# Patient Record
Sex: MALE | Race: WHITE | NOT HISPANIC OR LATINO | URBAN - METROPOLITAN AREA
[De-identification: names, ages, dates, MRNs, and addresses within clinical notes are randomized per-mention and may not be internally consistent; named-entity substitution may affect disease eponyms.]

---

## 2021-01-01 ENCOUNTER — INPATIENT (INPATIENT)
Facility: HOSPITAL | Age: 86
LOS: 4 days | DRG: 177 | End: 2021-01-17
Attending: INTERNAL MEDICINE | Admitting: INTERNAL MEDICINE
Payer: MEDICARE

## 2021-01-01 VITALS
OXYGEN SATURATION: 74 % | SYSTOLIC BLOOD PRESSURE: 121 MMHG | TEMPERATURE: 98 F | DIASTOLIC BLOOD PRESSURE: 79 MMHG | HEART RATE: 62 BPM | RESPIRATION RATE: 26 BRPM

## 2021-01-01 VITALS
HEART RATE: 87 BPM | TEMPERATURE: 99 F | OXYGEN SATURATION: 98 % | RESPIRATION RATE: 17 BRPM | WEIGHT: 138.89 LBS | DIASTOLIC BLOOD PRESSURE: 63 MMHG | SYSTOLIC BLOOD PRESSURE: 167 MMHG

## 2021-01-01 DIAGNOSIS — I10 ESSENTIAL (PRIMARY) HYPERTENSION: ICD-10-CM

## 2021-01-01 DIAGNOSIS — U07.1 COVID-19: ICD-10-CM

## 2021-01-01 DIAGNOSIS — E78.5 HYPERLIPIDEMIA, UNSPECIFIED: ICD-10-CM

## 2021-01-01 DIAGNOSIS — R77.8 OTHER SPECIFIED ABNORMALITIES OF PLASMA PROTEINS: ICD-10-CM

## 2021-01-01 DIAGNOSIS — Z71.89 OTHER SPECIFIED COUNSELING: ICD-10-CM

## 2021-01-01 DIAGNOSIS — Z51.5 ENCOUNTER FOR PALLIATIVE CARE: ICD-10-CM

## 2021-01-01 DIAGNOSIS — N17.9 ACUTE KIDNEY FAILURE, UNSPECIFIED: ICD-10-CM

## 2021-01-01 DIAGNOSIS — J18.9 PNEUMONIA, UNSPECIFIED ORGANISM: ICD-10-CM

## 2021-01-01 DIAGNOSIS — Z29.9 ENCOUNTER FOR PROPHYLACTIC MEASURES, UNSPECIFIED: ICD-10-CM

## 2021-01-01 DIAGNOSIS — E88.09 OTHER DISORDERS OF PLASMA-PROTEIN METABOLISM, NOT ELSEWHERE CLASSIFIED: ICD-10-CM

## 2021-01-01 DIAGNOSIS — B34.2 CORONAVIRUS INFECTION, UNSPECIFIED: ICD-10-CM

## 2021-01-01 DIAGNOSIS — N18.9 CHRONIC KIDNEY DISEASE, UNSPECIFIED: ICD-10-CM

## 2021-01-01 DIAGNOSIS — R53.81 OTHER MALAISE: ICD-10-CM

## 2021-01-01 DIAGNOSIS — M10.9 GOUT, UNSPECIFIED: ICD-10-CM

## 2021-01-01 LAB
-  AMIKACIN: SIGNIFICANT CHANGE UP
-  AZTREONAM: SIGNIFICANT CHANGE UP
-  CEFEPIME: SIGNIFICANT CHANGE UP
-  CEFTAZIDIME: SIGNIFICANT CHANGE UP
-  CIPROFLOXACIN: SIGNIFICANT CHANGE UP
-  GENTAMICIN: SIGNIFICANT CHANGE UP
-  IMIPENEM: SIGNIFICANT CHANGE UP
-  LEVOFLOXACIN: SIGNIFICANT CHANGE UP
-  MEROPENEM: SIGNIFICANT CHANGE UP
-  PIPERACILLIN/TAZOBACTAM: SIGNIFICANT CHANGE UP
-  TOBRAMYCIN: SIGNIFICANT CHANGE UP
A1C WITH ESTIMATED AVERAGE GLUCOSE RESULT: 4.6 % — SIGNIFICANT CHANGE UP (ref 4–5.6)
ALBUMIN SERPL ELPH-MCNC: 2.1 G/DL — LOW (ref 3.5–5)
ALBUMIN SERPL ELPH-MCNC: 2.2 G/DL — LOW (ref 3.5–5)
ALBUMIN SERPL ELPH-MCNC: 2.3 G/DL — LOW (ref 3.5–5)
ALBUMIN SERPL ELPH-MCNC: 2.4 G/DL — LOW (ref 3.5–5)
ALBUMIN SERPL ELPH-MCNC: 2.7 G/DL — LOW (ref 3.5–5)
ALP SERPL-CCNC: 69 U/L — SIGNIFICANT CHANGE UP (ref 40–120)
ALP SERPL-CCNC: 75 U/L — SIGNIFICANT CHANGE UP (ref 40–120)
ALP SERPL-CCNC: 77 U/L — SIGNIFICANT CHANGE UP (ref 40–120)
ALP SERPL-CCNC: 82 U/L — SIGNIFICANT CHANGE UP (ref 40–120)
ALP SERPL-CCNC: 94 U/L — SIGNIFICANT CHANGE UP (ref 40–120)
ALT FLD-CCNC: 22 U/L DA — SIGNIFICANT CHANGE UP (ref 10–60)
ALT FLD-CCNC: 22 U/L DA — SIGNIFICANT CHANGE UP (ref 10–60)
ALT FLD-CCNC: 24 U/L DA — SIGNIFICANT CHANGE UP (ref 10–60)
ALT FLD-CCNC: 32 U/L DA — SIGNIFICANT CHANGE UP (ref 10–60)
ALT FLD-CCNC: 39 U/L DA — SIGNIFICANT CHANGE UP (ref 10–60)
ANION GAP SERPL CALC-SCNC: 15 MMOL/L — SIGNIFICANT CHANGE UP (ref 5–17)
ANION GAP SERPL CALC-SCNC: 16 MMOL/L — SIGNIFICANT CHANGE UP (ref 5–17)
ANION GAP SERPL CALC-SCNC: 16 MMOL/L — SIGNIFICANT CHANGE UP (ref 5–17)
ANION GAP SERPL CALC-SCNC: 17 MMOL/L — SIGNIFICANT CHANGE UP (ref 5–17)
ANION GAP SERPL CALC-SCNC: 18 MMOL/L — HIGH (ref 5–17)
ANION GAP SERPL CALC-SCNC: 19 MMOL/L — HIGH (ref 5–17)
ANISOCYTOSIS BLD QL: SIGNIFICANT CHANGE UP
APPEARANCE UR: CLEAR — SIGNIFICANT CHANGE UP
APTT BLD: 29.2 SEC — SIGNIFICANT CHANGE UP (ref 27.5–35.5)
AST SERPL-CCNC: 17 U/L — SIGNIFICANT CHANGE UP (ref 10–40)
AST SERPL-CCNC: 17 U/L — SIGNIFICANT CHANGE UP (ref 10–40)
AST SERPL-CCNC: 18 U/L — SIGNIFICANT CHANGE UP (ref 10–40)
AST SERPL-CCNC: 21 U/L — SIGNIFICANT CHANGE UP (ref 10–40)
AST SERPL-CCNC: 23 U/L — SIGNIFICANT CHANGE UP (ref 10–40)
BACTERIA # UR AUTO: ABNORMAL /HPF
BASE EXCESS BLDA CALC-SCNC: -4.8 MMOL/L — LOW (ref -2–2)
BASOPHILS # BLD AUTO: 0.01 K/UL — SIGNIFICANT CHANGE UP (ref 0–0.2)
BASOPHILS # BLD AUTO: 0.01 K/UL — SIGNIFICANT CHANGE UP (ref 0–0.2)
BASOPHILS NFR BLD AUTO: 0.1 % — SIGNIFICANT CHANGE UP (ref 0–2)
BILIRUB SERPL-MCNC: 0.7 MG/DL — SIGNIFICANT CHANGE UP (ref 0.2–1.2)
BILIRUB SERPL-MCNC: 0.7 MG/DL — SIGNIFICANT CHANGE UP (ref 0.2–1.2)
BILIRUB SERPL-MCNC: 0.8 MG/DL — SIGNIFICANT CHANGE UP (ref 0.2–1.2)
BILIRUB SERPL-MCNC: 0.9 MG/DL — SIGNIFICANT CHANGE UP (ref 0.2–1.2)
BILIRUB SERPL-MCNC: 1.5 MG/DL — HIGH (ref 0.2–1.2)
BILIRUB UR-MCNC: NEGATIVE — SIGNIFICANT CHANGE UP
BLOOD GAS COMMENTS ARTERIAL: SIGNIFICANT CHANGE UP
BUN SERPL-MCNC: 167 MG/DL — HIGH (ref 7–18)
BUN SERPL-MCNC: 174 MG/DL — HIGH (ref 7–18)
BUN SERPL-MCNC: 181 MG/DL — HIGH (ref 7–18)
BUN SERPL-MCNC: 186 MG/DL — SIGNIFICANT CHANGE UP (ref 7–18)
BUN SERPL-MCNC: 188 MG/DL — HIGH (ref 7–18)
BUN SERPL-MCNC: 199 MG/DL — HIGH (ref 7–18)
CALCIUM SERPL-MCNC: 8.1 MG/DL — LOW (ref 8.4–10.5)
CALCIUM SERPL-MCNC: 8.2 MG/DL — LOW (ref 8.4–10.5)
CALCIUM SERPL-MCNC: 8.2 MG/DL — LOW (ref 8.4–10.5)
CALCIUM SERPL-MCNC: 8.3 MG/DL — LOW (ref 8.4–10.5)
CALCIUM SERPL-MCNC: 8.4 MG/DL — SIGNIFICANT CHANGE UP (ref 8.4–10.5)
CALCIUM SERPL-MCNC: 8.6 MG/DL — SIGNIFICANT CHANGE UP (ref 8.4–10.5)
CALCIUM UR-MCNC: 3.8 MG/DL — SIGNIFICANT CHANGE UP
CHLORIDE SERPL-SCNC: 104 MMOL/L — SIGNIFICANT CHANGE UP (ref 96–108)
CHLORIDE SERPL-SCNC: 107 MMOL/L — SIGNIFICANT CHANGE UP (ref 96–108)
CHLORIDE SERPL-SCNC: 109 MMOL/L — HIGH (ref 96–108)
CHLORIDE SERPL-SCNC: 109 MMOL/L — HIGH (ref 96–108)
CHLORIDE UR-SCNC: 41 MMOL/L — SIGNIFICANT CHANGE UP
CHOLEST SERPL-MCNC: 104 MG/DL — SIGNIFICANT CHANGE UP
CK MB BLD-MCNC: 7.2 % — HIGH (ref 0–3.5)
CK MB CFR SERPL CALC: 1.8 NG/ML — SIGNIFICANT CHANGE UP (ref 0–3.6)
CK SERPL-CCNC: 25 U/L — LOW (ref 35–232)
CO2 SERPL-SCNC: 16 MMOL/L — LOW (ref 22–31)
CO2 SERPL-SCNC: 17 MMOL/L — LOW (ref 22–31)
CO2 SERPL-SCNC: 17 MMOL/L — LOW (ref 22–31)
CO2 SERPL-SCNC: 19 MMOL/L — LOW (ref 22–31)
CO2 SERPL-SCNC: 19 MMOL/L — LOW (ref 22–31)
CO2 SERPL-SCNC: 21 MMOL/L — LOW (ref 22–31)
COLOR SPEC: YELLOW — SIGNIFICANT CHANGE UP
CREAT ?TM UR-MCNC: 49 MG/DL — SIGNIFICANT CHANGE UP
CREAT ?TM UR-MCNC: 51 MG/DL — SIGNIFICANT CHANGE UP
CREAT SERPL-MCNC: 4.99 MG/DL — HIGH (ref 0.5–1.3)
CREAT SERPL-MCNC: 5.06 MG/DL — HIGH (ref 0.5–1.3)
CREAT SERPL-MCNC: 5.16 MG/DL — HIGH (ref 0.5–1.3)
CREAT SERPL-MCNC: 5.26 MG/DL — HIGH (ref 0.5–1.3)
CREAT SERPL-MCNC: 5.27 MG/DL — HIGH (ref 0.5–1.3)
CREAT SERPL-MCNC: 5.84 MG/DL — HIGH (ref 0.5–1.3)
CRP SERPL-MCNC: 11.66 MG/DL — HIGH (ref 0–0.4)
CRP SERPL-MCNC: 15.88 MG/DL — HIGH (ref 0–0.4)
CULTURE RESULTS: SIGNIFICANT CHANGE UP
D DIMER BLD IA.RAPID-MCNC: 4239 NG/ML DDU — HIGH
DIFF PNL FLD: ABNORMAL
EOSINOPHIL # BLD AUTO: 0 K/UL — SIGNIFICANT CHANGE UP (ref 0–0.5)
EOSINOPHIL # BLD AUTO: 0 K/UL — SIGNIFICANT CHANGE UP (ref 0–0.5)
EOSINOPHIL NFR BLD AUTO: 0 % — SIGNIFICANT CHANGE UP (ref 0–6)
EPI CELLS # UR: SIGNIFICANT CHANGE UP /HPF
ERYTHROCYTE [SEDIMENTATION RATE] IN BLOOD: 75 MM/HR — HIGH (ref 0–20)
ESTIMATED AVERAGE GLUCOSE: 85 MG/DL — SIGNIFICANT CHANGE UP (ref 68–114)
FERRITIN SERPL-MCNC: 1527 NG/ML — HIGH (ref 30–400)
FERRITIN SERPL-MCNC: 4201 NG/ML — HIGH (ref 30–400)
FOLATE SERPL-MCNC: 12.8 NG/ML — SIGNIFICANT CHANGE UP
GLUCOSE BLDC GLUCOMTR-MCNC: 106 MG/DL — HIGH (ref 70–99)
GLUCOSE BLDC GLUCOMTR-MCNC: 118 MG/DL — HIGH (ref 70–99)
GLUCOSE BLDC GLUCOMTR-MCNC: 144 MG/DL — HIGH (ref 70–99)
GLUCOSE SERPL-MCNC: 110 MG/DL — HIGH (ref 70–99)
GLUCOSE SERPL-MCNC: 132 MG/DL — HIGH (ref 70–99)
GLUCOSE SERPL-MCNC: 146 MG/DL — HIGH (ref 70–99)
GLUCOSE SERPL-MCNC: 154 MG/DL — HIGH (ref 70–99)
GLUCOSE SERPL-MCNC: 157 MG/DL — HIGH (ref 70–99)
GLUCOSE SERPL-MCNC: 234 MG/DL — HIGH (ref 70–99)
GLUCOSE UR QL: NEGATIVE — SIGNIFICANT CHANGE UP
HCO3 BLDA-SCNC: 19 MMOL/L — LOW (ref 23–27)
HCT VFR BLD CALC: 28.7 % — LOW (ref 39–50)
HCT VFR BLD CALC: 28.8 % — LOW (ref 39–50)
HCT VFR BLD CALC: 29.9 % — LOW (ref 39–50)
HCT VFR BLD CALC: 30.5 % — LOW (ref 39–50)
HCT VFR BLD CALC: 32 % — LOW (ref 39–50)
HCT VFR BLD CALC: 33.7 % — LOW (ref 39–50)
HDLC SERPL-MCNC: 41 MG/DL — SIGNIFICANT CHANGE UP
HGB BLD-MCNC: 10.2 G/DL — LOW (ref 13–17)
HGB BLD-MCNC: 8.8 G/DL — LOW (ref 13–17)
HGB BLD-MCNC: 9 G/DL — LOW (ref 13–17)
HGB BLD-MCNC: 9.2 G/DL — LOW (ref 13–17)
HGB BLD-MCNC: 9.6 G/DL — LOW (ref 13–17)
HGB BLD-MCNC: 9.8 G/DL — LOW (ref 13–17)
HOROWITZ INDEX BLDA+IHG-RTO: 100 — SIGNIFICANT CHANGE UP
HYALINE CASTS # UR AUTO: ABNORMAL /LPF
HYPERCHROMIA BLD QL AUTO: SLIGHT — SIGNIFICANT CHANGE UP
IMM GRANULOCYTES NFR BLD AUTO: 0.7 % — SIGNIFICANT CHANGE UP (ref 0–1.5)
IMM GRANULOCYTES NFR BLD AUTO: 1 % — SIGNIFICANT CHANGE UP (ref 0–1.5)
INR BLD: 1.67 RATIO — HIGH (ref 0.88–1.16)
INR BLD: 1.72 RATIO — HIGH (ref 0.88–1.16)
KETONES UR-MCNC: NEGATIVE — SIGNIFICANT CHANGE UP
LACTATE SERPL-SCNC: 1.2 MMOL/L — SIGNIFICANT CHANGE UP (ref 0.7–2)
LDH SERPL L TO P-CCNC: 302 U/L — HIGH (ref 120–225)
LEUKOCYTE ESTERASE UR-ACNC: ABNORMAL
LIPID PNL WITH DIRECT LDL SERPL: 41 MG/DL — SIGNIFICANT CHANGE UP
LYMPHOCYTES # BLD AUTO: 0.21 K/UL — LOW (ref 1–3.3)
LYMPHOCYTES # BLD AUTO: 0.53 K/UL — LOW (ref 1–3.3)
LYMPHOCYTES # BLD AUTO: 5.9 % — LOW (ref 13–44)
MAGNESIUM SERPL-MCNC: 1.9 MG/DL — SIGNIFICANT CHANGE UP (ref 1.6–2.6)
MAGNESIUM SERPL-MCNC: 2 MG/DL — SIGNIFICANT CHANGE UP (ref 1.6–2.6)
MAGNESIUM SERPL-MCNC: 2 MG/DL — SIGNIFICANT CHANGE UP (ref 1.6–2.6)
MCHC RBC-ENTMCNC: 28.8 PG — SIGNIFICANT CHANGE UP (ref 27–34)
MCHC RBC-ENTMCNC: 28.8 PG — SIGNIFICANT CHANGE UP (ref 27–34)
MCHC RBC-ENTMCNC: 28.9 PG — SIGNIFICANT CHANGE UP (ref 27–34)
MCHC RBC-ENTMCNC: 29 PG — SIGNIFICANT CHANGE UP (ref 27–34)
MCHC RBC-ENTMCNC: 29.4 PG — SIGNIFICANT CHANGE UP (ref 27–34)
MCHC RBC-ENTMCNC: 29.8 PG — SIGNIFICANT CHANGE UP (ref 27–34)
MCHC RBC-ENTMCNC: 30.3 GM/DL — LOW (ref 32–36)
MCHC RBC-ENTMCNC: 30.6 GM/DL — LOW (ref 32–36)
MCHC RBC-ENTMCNC: 30.7 GM/DL — LOW (ref 32–36)
MCHC RBC-ENTMCNC: 30.8 GM/DL — LOW (ref 32–36)
MCHC RBC-ENTMCNC: 31.3 GM/DL — LOW (ref 32–36)
MCHC RBC-ENTMCNC: 31.5 GM/DL — LOW (ref 32–36)
MCV RBC AUTO: 93.8 FL — SIGNIFICANT CHANGE UP (ref 80–100)
MCV RBC AUTO: 94.1 FL — SIGNIFICANT CHANGE UP (ref 80–100)
MCV RBC AUTO: 94.1 FL — SIGNIFICANT CHANGE UP (ref 80–100)
MCV RBC AUTO: 94.3 FL — SIGNIFICANT CHANGE UP (ref 80–100)
MCV RBC AUTO: 94.7 FL — SIGNIFICANT CHANGE UP (ref 80–100)
MCV RBC AUTO: 95.5 FL — SIGNIFICANT CHANGE UP (ref 80–100)
METHOD TYPE: SIGNIFICANT CHANGE UP
MICROCYTES BLD QL: SIGNIFICANT CHANGE UP
MONOCYTES # BLD AUTO: 0.12 K/UL — SIGNIFICANT CHANGE UP (ref 0–0.9)
MONOCYTES # BLD AUTO: 0.17 K/UL — SIGNIFICANT CHANGE UP (ref 0–0.9)
MONOCYTES NFR BLD AUTO: 1.9 % — LOW (ref 2–14)
NEUTROPHILS # BLD AUTO: 7.61 K/UL — HIGH (ref 1.8–7.4)
NEUTROPHILS # BLD AUTO: 8.28 K/UL — HIGH (ref 1.8–7.4)
NEUTROPHILS NFR BLD AUTO: 91.4 % — HIGH (ref 43–77)
NEUTROPHILS NFR BLD AUTO: 94.8 % — HIGH (ref 43–77)
NITRITE UR-MCNC: NEGATIVE — SIGNIFICANT CHANGE UP
NON HDL CHOLESTEROL: 63 MG/DL — SIGNIFICANT CHANGE UP
NRBC # BLD: 0 /100 WBCS — SIGNIFICANT CHANGE UP (ref 0–0)
NT-PROBNP SERPL-SCNC: 9057 PG/ML — HIGH (ref 0–450)
ORGANISM # SPEC MICROSCOPIC CNT: SIGNIFICANT CHANGE UP
ORGANISM # SPEC MICROSCOPIC CNT: SIGNIFICANT CHANGE UP
OSMOLALITY UR: 392 MOS/KG — SIGNIFICANT CHANGE UP (ref 50–1200)
OVALOCYTES BLD QL SMEAR: SLIGHT — SIGNIFICANT CHANGE UP
PCO2 BLDA: 31 MMHG — LOW (ref 32–46)
PH BLDA: 7.4 — SIGNIFICANT CHANGE UP (ref 7.35–7.45)
PH UR: 5 — SIGNIFICANT CHANGE UP (ref 5–8)
PHOSPHATE SERPL-MCNC: 5.8 MG/DL — HIGH (ref 2.5–4.5)
PHOSPHATE SERPL-MCNC: 6.3 MG/DL — HIGH (ref 2.5–4.5)
PHOSPHATE SERPL-MCNC: 6.3 MG/DL — HIGH (ref 2.5–4.5)
PHOSPHATE SERPL-MCNC: 7 MG/DL — HIGH (ref 2.5–4.5)
PHOSPHATE SERPL-MCNC: 7.2 MG/DL — HIGH (ref 2.5–4.5)
PLAT MORPH BLD: NORMAL — SIGNIFICANT CHANGE UP
PLATELET # BLD AUTO: 101 K/UL — LOW (ref 150–400)
PLATELET # BLD AUTO: 106 K/UL — LOW (ref 150–400)
PLATELET # BLD AUTO: 109 K/UL — LOW (ref 150–400)
PLATELET # BLD AUTO: 118 K/UL — LOW (ref 150–400)
PLATELET # BLD AUTO: 118 K/UL — LOW (ref 150–400)
PLATELET # BLD AUTO: 93 K/UL — LOW (ref 150–400)
PO2 BLDA: 128 MMHG — HIGH (ref 74–108)
POIKILOCYTOSIS BLD QL AUTO: SLIGHT — SIGNIFICANT CHANGE UP
POTASSIUM SERPL-MCNC: 3.6 MMOL/L — SIGNIFICANT CHANGE UP (ref 3.5–5.3)
POTASSIUM SERPL-MCNC: 3.9 MMOL/L — SIGNIFICANT CHANGE UP (ref 3.5–5.3)
POTASSIUM SERPL-MCNC: 4 MMOL/L — SIGNIFICANT CHANGE UP (ref 3.5–5.3)
POTASSIUM SERPL-MCNC: 4.1 MMOL/L — SIGNIFICANT CHANGE UP (ref 3.5–5.3)
POTASSIUM SERPL-MCNC: 4.1 MMOL/L — SIGNIFICANT CHANGE UP (ref 3.5–5.3)
POTASSIUM SERPL-MCNC: 4.3 MMOL/L — SIGNIFICANT CHANGE UP (ref 3.5–5.3)
POTASSIUM SERPL-SCNC: 3.6 MMOL/L — SIGNIFICANT CHANGE UP (ref 3.5–5.3)
POTASSIUM SERPL-SCNC: 3.9 MMOL/L — SIGNIFICANT CHANGE UP (ref 3.5–5.3)
POTASSIUM SERPL-SCNC: 4 MMOL/L — SIGNIFICANT CHANGE UP (ref 3.5–5.3)
POTASSIUM SERPL-SCNC: 4.1 MMOL/L — SIGNIFICANT CHANGE UP (ref 3.5–5.3)
POTASSIUM SERPL-SCNC: 4.1 MMOL/L — SIGNIFICANT CHANGE UP (ref 3.5–5.3)
POTASSIUM SERPL-SCNC: 4.3 MMOL/L — SIGNIFICANT CHANGE UP (ref 3.5–5.3)
PROT SERPL-MCNC: 5.9 G/DL — LOW (ref 6–8.3)
PROT SERPL-MCNC: 6 G/DL — SIGNIFICANT CHANGE UP (ref 6–8.3)
PROT SERPL-MCNC: 6.3 G/DL — SIGNIFICANT CHANGE UP (ref 6–8.3)
PROT SERPL-MCNC: 6.3 G/DL — SIGNIFICANT CHANGE UP (ref 6–8.3)
PROT SERPL-MCNC: 6.5 G/DL — SIGNIFICANT CHANGE UP (ref 6–8.3)
PROT UR-MCNC: 100
PROTHROM AB SERPL-ACNC: 19.4 SEC — HIGH (ref 10.6–13.6)
PROTHROM AB SERPL-ACNC: 19.9 SEC — HIGH (ref 10.6–13.6)
RAPID RVP RESULT: DETECTED
RBC # BLD: 3.06 M/UL — LOW (ref 4.2–5.8)
RBC # BLD: 3.06 M/UL — LOW (ref 4.2–5.8)
RBC # BLD: 3.17 M/UL — LOW (ref 4.2–5.8)
RBC # BLD: 3.22 M/UL — LOW (ref 4.2–5.8)
RBC # BLD: 3.4 M/UL — LOW (ref 4.2–5.8)
RBC # BLD: 3.53 M/UL — LOW (ref 4.2–5.8)
RBC # FLD: 17.6 % — HIGH (ref 10.3–14.5)
RBC # FLD: 17.7 % — HIGH (ref 10.3–14.5)
RBC # FLD: 17.7 % — HIGH (ref 10.3–14.5)
RBC # FLD: 17.9 % — HIGH (ref 10.3–14.5)
RBC # FLD: 18 % — HIGH (ref 10.3–14.5)
RBC # FLD: 18.2 % — HIGH (ref 10.3–14.5)
RBC BLD AUTO: ABNORMAL
RBC CASTS # UR COMP ASSIST: ABNORMAL /HPF (ref 0–2)
SAO2 % BLDA: 99 % — HIGH (ref 92–96)
SARS-COV-2 IGG SERPL QL IA: POSITIVE
SARS-COV-2 IGM SERPL IA-ACNC: 6.82 INDEX — HIGH
SARS-COV-2 RNA SPEC QL NAA+PROBE: DETECTED
SCHISTOCYTES BLD QL AUTO: SLIGHT — SIGNIFICANT CHANGE UP
SODIUM SERPL-SCNC: 140 MMOL/L — SIGNIFICANT CHANGE UP (ref 135–145)
SODIUM SERPL-SCNC: 142 MMOL/L — SIGNIFICANT CHANGE UP (ref 135–145)
SODIUM SERPL-SCNC: 143 MMOL/L — SIGNIFICANT CHANGE UP (ref 135–145)
SODIUM SERPL-SCNC: 144 MMOL/L — SIGNIFICANT CHANGE UP (ref 135–145)
SODIUM UR-SCNC: 47 MMOL/L — SIGNIFICANT CHANGE UP
SODIUM UR-SCNC: 54 MMOL/L — SIGNIFICANT CHANGE UP
SP GR SPEC: 1.01 — SIGNIFICANT CHANGE UP (ref 1.01–1.02)
SPECIMEN SOURCE: SIGNIFICANT CHANGE UP
SPHEROCYTES BLD QL SMEAR: SLIGHT — SIGNIFICANT CHANGE UP
TRIGL SERPL-MCNC: 112 MG/DL — SIGNIFICANT CHANGE UP
TROPONIN I SERPL-MCNC: 0.06 NG/ML — HIGH (ref 0–0.04)
TROPONIN I SERPL-MCNC: 0.07 NG/ML — HIGH (ref 0–0.04)
TSH SERPL-MCNC: 0.57 UU/ML — SIGNIFICANT CHANGE UP (ref 0.34–4.82)
UROBILINOGEN FLD QL: NEGATIVE — SIGNIFICANT CHANGE UP
VIT B12 SERPL-MCNC: 810 PG/ML — SIGNIFICANT CHANGE UP (ref 232–1245)
WBC # BLD: 3.31 K/UL — LOW (ref 3.8–10.5)
WBC # BLD: 5.88 K/UL — SIGNIFICANT CHANGE UP (ref 3.8–10.5)
WBC # BLD: 6.84 K/UL — SIGNIFICANT CHANGE UP (ref 3.8–10.5)
WBC # BLD: 8.03 K/UL — SIGNIFICANT CHANGE UP (ref 3.8–10.5)
WBC # BLD: 8.89 K/UL — SIGNIFICANT CHANGE UP (ref 3.8–10.5)
WBC # BLD: 9.05 K/UL — SIGNIFICANT CHANGE UP (ref 3.8–10.5)
WBC # FLD AUTO: 3.31 K/UL — LOW (ref 3.8–10.5)
WBC # FLD AUTO: 5.88 K/UL — SIGNIFICANT CHANGE UP (ref 3.8–10.5)
WBC # FLD AUTO: 6.84 K/UL — SIGNIFICANT CHANGE UP (ref 3.8–10.5)
WBC # FLD AUTO: 8.03 K/UL — SIGNIFICANT CHANGE UP (ref 3.8–10.5)
WBC # FLD AUTO: 8.89 K/UL — SIGNIFICANT CHANGE UP (ref 3.8–10.5)
WBC # FLD AUTO: 9.05 K/UL — SIGNIFICANT CHANGE UP (ref 3.8–10.5)
WBC UR QL: SIGNIFICANT CHANGE UP /HPF (ref 0–5)

## 2021-01-01 PROCEDURE — 99222 1ST HOSP IP/OBS MODERATE 55: CPT

## 2021-01-01 PROCEDURE — 85652 RBC SED RATE AUTOMATED: CPT

## 2021-01-01 PROCEDURE — 82746 ASSAY OF FOLIC ACID SERUM: CPT

## 2021-01-01 PROCEDURE — 85379 FIBRIN DEGRADATION QUANT: CPT

## 2021-01-01 PROCEDURE — 87186 SC STD MICRODIL/AGAR DIL: CPT

## 2021-01-01 PROCEDURE — 80053 COMPREHEN METABOLIC PANEL: CPT

## 2021-01-01 PROCEDURE — 84100 ASSAY OF PHOSPHORUS: CPT

## 2021-01-01 PROCEDURE — 87086 URINE CULTURE/COLONY COUNT: CPT

## 2021-01-01 PROCEDURE — 85730 THROMBOPLASTIN TIME PARTIAL: CPT

## 2021-01-01 PROCEDURE — 82550 ASSAY OF CK (CPK): CPT

## 2021-01-01 PROCEDURE — 84484 ASSAY OF TROPONIN QUANT: CPT

## 2021-01-01 PROCEDURE — 83935 ASSAY OF URINE OSMOLALITY: CPT

## 2021-01-01 PROCEDURE — 36415 COLL VENOUS BLD VENIPUNCTURE: CPT

## 2021-01-01 PROCEDURE — 84443 ASSAY THYROID STIM HORMONE: CPT

## 2021-01-01 PROCEDURE — 82803 BLOOD GASES ANY COMBINATION: CPT

## 2021-01-01 PROCEDURE — 71045 X-RAY EXAM CHEST 1 VIEW: CPT

## 2021-01-01 PROCEDURE — 82607 VITAMIN B-12: CPT

## 2021-01-01 PROCEDURE — 82570 ASSAY OF URINE CREATININE: CPT

## 2021-01-01 PROCEDURE — 85025 COMPLETE CBC W/AUTO DIFF WBC: CPT

## 2021-01-01 PROCEDURE — 85610 PROTHROMBIN TIME: CPT

## 2021-01-01 PROCEDURE — 82436 ASSAY OF URINE CHLORIDE: CPT

## 2021-01-01 PROCEDURE — 87040 BLOOD CULTURE FOR BACTERIA: CPT

## 2021-01-01 PROCEDURE — 83605 ASSAY OF LACTIC ACID: CPT

## 2021-01-01 PROCEDURE — 80061 LIPID PANEL: CPT

## 2021-01-01 PROCEDURE — 94640 AIRWAY INHALATION TREATMENT: CPT

## 2021-01-01 PROCEDURE — 93005 ELECTROCARDIOGRAM TRACING: CPT

## 2021-01-01 PROCEDURE — 83880 ASSAY OF NATRIURETIC PEPTIDE: CPT

## 2021-01-01 PROCEDURE — 82340 ASSAY OF CALCIUM IN URINE: CPT

## 2021-01-01 PROCEDURE — 83735 ASSAY OF MAGNESIUM: CPT

## 2021-01-01 PROCEDURE — 82728 ASSAY OF FERRITIN: CPT

## 2021-01-01 PROCEDURE — 86769 SARS-COV-2 COVID-19 ANTIBODY: CPT

## 2021-01-01 PROCEDURE — 85027 COMPLETE CBC AUTOMATED: CPT

## 2021-01-01 PROCEDURE — 80048 BASIC METABOLIC PNL TOTAL CA: CPT

## 2021-01-01 PROCEDURE — 99285 EMERGENCY DEPT VISIT HI MDM: CPT

## 2021-01-01 PROCEDURE — 83036 HEMOGLOBIN GLYCOSYLATED A1C: CPT

## 2021-01-01 PROCEDURE — 71045 X-RAY EXAM CHEST 1 VIEW: CPT | Mod: 26

## 2021-01-01 PROCEDURE — 0225U NFCT DS DNA&RNA 21 SARSCOV2: CPT

## 2021-01-01 PROCEDURE — 82553 CREATINE MB FRACTION: CPT

## 2021-01-01 PROCEDURE — 83615 LACTATE (LD) (LDH) ENZYME: CPT

## 2021-01-01 PROCEDURE — 99285 EMERGENCY DEPT VISIT HI MDM: CPT | Mod: 25

## 2021-01-01 PROCEDURE — 84300 ASSAY OF URINE SODIUM: CPT

## 2021-01-01 PROCEDURE — 99497 ADVNCD CARE PLAN 30 MIN: CPT | Mod: 25

## 2021-01-01 PROCEDURE — 96374 THER/PROPH/DIAG INJ IV PUSH: CPT

## 2021-01-01 PROCEDURE — 86140 C-REACTIVE PROTEIN: CPT

## 2021-01-01 PROCEDURE — 81001 URINALYSIS AUTO W/SCOPE: CPT

## 2021-01-01 PROCEDURE — 82962 GLUCOSE BLOOD TEST: CPT

## 2021-01-01 RX ORDER — HYDROMORPHONE HYDROCHLORIDE 2 MG/ML
0.5 INJECTION INTRAMUSCULAR; INTRAVENOUS; SUBCUTANEOUS EVERY 4 HOURS
Refills: 0 | Status: DISCONTINUED | OUTPATIENT
Start: 2021-01-01 | End: 2021-01-01

## 2021-01-01 RX ORDER — ALLOPURINOL 300 MG
100 TABLET ORAL AT BEDTIME
Refills: 0 | Status: DISCONTINUED | OUTPATIENT
Start: 2021-01-01 | End: 2021-01-01

## 2021-01-01 RX ORDER — HYDRALAZINE HCL 50 MG
1 TABLET ORAL
Qty: 0 | Refills: 0 | DISCHARGE

## 2021-01-01 RX ORDER — ERYTHROPOIETIN 10000 [IU]/ML
10000 INJECTION, SOLUTION INTRAVENOUS; SUBCUTANEOUS
Refills: 0 | Status: DISCONTINUED | OUTPATIENT
Start: 2021-01-01 | End: 2021-01-01

## 2021-01-01 RX ORDER — POTASSIUM CHLORIDE 20 MEQ
20 PACKET (EA) ORAL
Refills: 0 | Status: COMPLETED | OUTPATIENT
Start: 2021-01-01 | End: 2021-01-01

## 2021-01-01 RX ORDER — ALLOPURINOL 300 MG
1 TABLET ORAL
Qty: 0 | Refills: 0 | DISCHARGE

## 2021-01-01 RX ORDER — ACETAMINOPHEN 500 MG
1000 TABLET ORAL ONCE
Refills: 0 | Status: COMPLETED | OUTPATIENT
Start: 2021-01-01 | End: 2021-01-01

## 2021-01-01 RX ORDER — ZINC SULFATE TAB 220 MG (50 MG ZINC EQUIVALENT) 220 (50 ZN) MG
1 TAB ORAL
Qty: 0 | Refills: 0 | DISCHARGE

## 2021-01-01 RX ORDER — LATANOPROST 0.05 MG/ML
1 SOLUTION/ DROPS OPHTHALMIC; TOPICAL AT BEDTIME
Refills: 0 | Status: DISCONTINUED | OUTPATIENT
Start: 2021-01-01 | End: 2021-01-01

## 2021-01-01 RX ORDER — APIXABAN 2.5 MG/1
1 TABLET, FILM COATED ORAL
Qty: 0 | Refills: 0 | DISCHARGE

## 2021-01-01 RX ORDER — SEVELAMER CARBONATE 2400 MG/1
800 POWDER, FOR SUSPENSION ORAL
Refills: 0 | Status: DISCONTINUED | OUTPATIENT
Start: 2021-01-01 | End: 2021-01-01

## 2021-01-01 RX ORDER — DEXAMETHASONE 0.5 MG/5ML
6 ELIXIR ORAL ONCE
Refills: 0 | Status: COMPLETED | OUTPATIENT
Start: 2021-01-01 | End: 2021-01-01

## 2021-01-01 RX ORDER — HEPARIN SODIUM 5000 [USP'U]/ML
5000 INJECTION INTRAVENOUS; SUBCUTANEOUS EVERY 8 HOURS
Refills: 0 | Status: DISCONTINUED | OUTPATIENT
Start: 2021-01-01 | End: 2021-01-01

## 2021-01-01 RX ORDER — ASCORBIC ACID 60 MG
1 TABLET,CHEWABLE ORAL
Qty: 0 | Refills: 0 | DISCHARGE

## 2021-01-01 RX ORDER — AMLODIPINE BESYLATE 2.5 MG/1
10 TABLET ORAL DAILY
Refills: 0 | Status: DISCONTINUED | OUTPATIENT
Start: 2021-01-01 | End: 2021-01-01

## 2021-01-01 RX ORDER — SODIUM CHLORIDE 9 MG/ML
1000 INJECTION, SOLUTION INTRAVENOUS
Refills: 0 | Status: DISCONTINUED | OUTPATIENT
Start: 2021-01-01 | End: 2021-01-01

## 2021-01-01 RX ORDER — DEXAMETHASONE 0.5 MG/5ML
6 ELIXIR ORAL DAILY
Refills: 0 | Status: DISCONTINUED | OUTPATIENT
Start: 2021-01-01 | End: 2021-01-01

## 2021-01-01 RX ORDER — SODIUM BICARBONATE 1 MEQ/ML
1 SYRINGE (ML) INTRAVENOUS
Qty: 0 | Refills: 0 | DISCHARGE

## 2021-01-01 RX ORDER — SODIUM BICARBONATE 1 MEQ/ML
650 SYRINGE (ML) INTRAVENOUS DAILY
Refills: 0 | Status: DISCONTINUED | OUTPATIENT
Start: 2021-01-01 | End: 2021-01-01

## 2021-01-01 RX ORDER — PANTOPRAZOLE SODIUM 20 MG/1
40 TABLET, DELAYED RELEASE ORAL
Refills: 0 | Status: DISCONTINUED | OUTPATIENT
Start: 2021-01-01 | End: 2021-01-01

## 2021-01-01 RX ORDER — SODIUM BICARBONATE 1 MEQ/ML
650 SYRINGE (ML) INTRAVENOUS THREE TIMES A DAY
Refills: 0 | Status: DISCONTINUED | OUTPATIENT
Start: 2021-01-01 | End: 2021-01-01

## 2021-01-01 RX ORDER — SEVELAMER CARBONATE 2400 MG/1
1 POWDER, FOR SUSPENSION ORAL
Qty: 0 | Refills: 0 | DISCHARGE

## 2021-01-01 RX ORDER — CHOLECALCIFEROL (VITAMIN D3) 125 MCG
2000 CAPSULE ORAL DAILY
Refills: 0 | Status: DISCONTINUED | OUTPATIENT
Start: 2021-01-01 | End: 2021-01-01

## 2021-01-01 RX ORDER — FAMOTIDINE 10 MG/ML
1 INJECTION INTRAVENOUS
Qty: 0 | Refills: 0 | DISCHARGE

## 2021-01-01 RX ORDER — ASPIRIN/CALCIUM CARB/MAGNESIUM 324 MG
1 TABLET ORAL
Qty: 0 | Refills: 0 | DISCHARGE

## 2021-01-01 RX ORDER — HYDROMORPHONE HYDROCHLORIDE 2 MG/ML
0.25 INJECTION INTRAMUSCULAR; INTRAVENOUS; SUBCUTANEOUS ONCE
Refills: 0 | Status: DISCONTINUED | OUTPATIENT
Start: 2021-01-01 | End: 2021-01-01

## 2021-01-01 RX ORDER — MONTELUKAST 4 MG/1
10 TABLET, CHEWABLE ORAL DAILY
Refills: 0 | Status: DISCONTINUED | OUTPATIENT
Start: 2021-01-01 | End: 2021-01-01

## 2021-01-01 RX ORDER — FUROSEMIDE 40 MG
40 TABLET ORAL ONCE
Refills: 0 | Status: COMPLETED | OUTPATIENT
Start: 2021-01-01 | End: 2021-01-01

## 2021-01-01 RX ORDER — FAMOTIDINE 10 MG/ML
20 INJECTION INTRAVENOUS DAILY
Refills: 0 | Status: DISCONTINUED | OUTPATIENT
Start: 2021-01-01 | End: 2021-01-01

## 2021-01-01 RX ORDER — ASCORBIC ACID 60 MG
500 TABLET,CHEWABLE ORAL DAILY
Refills: 0 | Status: DISCONTINUED | OUTPATIENT
Start: 2021-01-01 | End: 2021-01-01

## 2021-01-01 RX ORDER — HYDRALAZINE HCL 50 MG
50 TABLET ORAL THREE TIMES A DAY
Refills: 0 | Status: DISCONTINUED | OUTPATIENT
Start: 2021-01-01 | End: 2021-01-01

## 2021-01-01 RX ORDER — DEXAMETHASONE 0.5 MG/5ML
1 ELIXIR ORAL
Qty: 0 | Refills: 0 | DISCHARGE

## 2021-01-01 RX ORDER — ALBUTEROL 90 UG/1
1 AEROSOL, METERED ORAL EVERY 4 HOURS
Refills: 0 | Status: DISCONTINUED | OUTPATIENT
Start: 2021-01-01 | End: 2021-01-01

## 2021-01-01 RX ORDER — HYDROMORPHONE HYDROCHLORIDE 2 MG/ML
0.25 INJECTION INTRAMUSCULAR; INTRAVENOUS; SUBCUTANEOUS EVERY 4 HOURS
Refills: 0 | Status: DISCONTINUED | OUTPATIENT
Start: 2021-01-01 | End: 2021-01-01

## 2021-01-01 RX ORDER — FERROUS SULFATE 325(65) MG
90 TABLET ORAL
Qty: 0 | Refills: 0 | DISCHARGE

## 2021-01-01 RX ORDER — PATIROMER 8.4 G/1
1 POWDER, FOR SUSPENSION ORAL
Qty: 0 | Refills: 0 | DISCHARGE

## 2021-01-01 RX ORDER — BIMATOPROST 0.3 MG/ML
1 SOLUTION/ DROPS OPHTHALMIC
Qty: 0 | Refills: 0 | DISCHARGE

## 2021-01-01 RX ORDER — ACETAMINOPHEN 500 MG
650 TABLET ORAL EVERY 6 HOURS
Refills: 0 | Status: DISCONTINUED | OUTPATIENT
Start: 2021-01-01 | End: 2021-01-01

## 2021-01-01 RX ORDER — ZINC SULFATE TAB 220 MG (50 MG ZINC EQUIVALENT) 220 (50 ZN) MG
220 TAB ORAL DAILY
Refills: 0 | Status: DISCONTINUED | OUTPATIENT
Start: 2021-01-01 | End: 2021-01-01

## 2021-01-01 RX ORDER — METOPROLOL TARTRATE 50 MG
50 TABLET ORAL THREE TIMES A DAY
Refills: 0 | Status: DISCONTINUED | OUTPATIENT
Start: 2021-01-01 | End: 2021-01-01

## 2021-01-01 RX ORDER — METOPROLOL TARTRATE 50 MG
1 TABLET ORAL
Qty: 0 | Refills: 0 | DISCHARGE

## 2021-01-01 RX ORDER — FAMOTIDINE 10 MG/ML
20 INJECTION INTRAVENOUS EVERY 24 HOURS
Refills: 0 | Status: DISCONTINUED | OUTPATIENT
Start: 2021-01-01 | End: 2021-01-01

## 2021-01-01 RX ORDER — AMLODIPINE BESYLATE 2.5 MG/1
1 TABLET ORAL
Qty: 0 | Refills: 0 | DISCHARGE

## 2021-01-01 RX ORDER — DARBEPOETIN ALFA IN POLYSORBAT 200MCG/0.4
0.8 PEN INJECTOR (ML) SUBCUTANEOUS
Qty: 0 | Refills: 0 | DISCHARGE

## 2021-01-01 RX ADMIN — PANTOPRAZOLE SODIUM 40 MILLIGRAM(S): 20 TABLET, DELAYED RELEASE ORAL at 05:21

## 2021-01-01 RX ADMIN — ERYTHROPOIETIN 10000 UNIT(S): 10000 INJECTION, SOLUTION INTRAVENOUS; SUBCUTANEOUS at 12:45

## 2021-01-01 RX ADMIN — HYDROMORPHONE HYDROCHLORIDE 0.5 MILLIGRAM(S): 2 INJECTION INTRAMUSCULAR; INTRAVENOUS; SUBCUTANEOUS at 11:57

## 2021-01-01 RX ADMIN — HEPARIN SODIUM 5000 UNIT(S): 5000 INJECTION INTRAVENOUS; SUBCUTANEOUS at 06:52

## 2021-01-01 RX ADMIN — Medication 50 MILLIGRAM(S): at 14:17

## 2021-01-01 RX ADMIN — SEVELAMER CARBONATE 800 MILLIGRAM(S): 2400 POWDER, FOR SUSPENSION ORAL at 11:20

## 2021-01-01 RX ADMIN — Medication 500 MILLIGRAM(S): at 12:33

## 2021-01-01 RX ADMIN — HEPARIN SODIUM 5000 UNIT(S): 5000 INJECTION INTRAVENOUS; SUBCUTANEOUS at 12:45

## 2021-01-01 RX ADMIN — ZINC SULFATE TAB 220 MG (50 MG ZINC EQUIVALENT) 220 MILLIGRAM(S): 220 (50 ZN) TAB at 11:20

## 2021-01-01 RX ADMIN — Medication 650 MILLIGRAM(S): at 12:45

## 2021-01-01 RX ADMIN — Medication 100 MILLIGRAM(S): at 21:55

## 2021-01-01 RX ADMIN — Medication 50 MILLIGRAM(S): at 06:18

## 2021-01-01 RX ADMIN — SODIUM CHLORIDE 75 MILLILITER(S): 9 INJECTION, SOLUTION INTRAVENOUS at 12:41

## 2021-01-01 RX ADMIN — Medication 30 MILLIGRAM(S): at 05:20

## 2021-01-01 RX ADMIN — HEPARIN SODIUM 5000 UNIT(S): 5000 INJECTION INTRAVENOUS; SUBCUTANEOUS at 05:58

## 2021-01-01 RX ADMIN — ALBUTEROL 1 PUFF(S): 90 AEROSOL, METERED ORAL at 12:04

## 2021-01-01 RX ADMIN — Medication 50 MILLIGRAM(S): at 06:51

## 2021-01-01 RX ADMIN — Medication 50 MILLIGRAM(S): at 23:57

## 2021-01-01 RX ADMIN — Medication 2000 UNIT(S): at 11:03

## 2021-01-01 RX ADMIN — ALBUTEROL 1 PUFF(S): 90 AEROSOL, METERED ORAL at 00:21

## 2021-01-01 RX ADMIN — Medication 6 MILLIGRAM(S): at 16:39

## 2021-01-01 RX ADMIN — Medication 50 MILLIGRAM(S): at 05:19

## 2021-01-01 RX ADMIN — ALBUTEROL 1 PUFF(S): 90 AEROSOL, METERED ORAL at 05:22

## 2021-01-01 RX ADMIN — ALBUTEROL 1 PUFF(S): 90 AEROSOL, METERED ORAL at 06:18

## 2021-01-01 RX ADMIN — FAMOTIDINE 20 MILLIGRAM(S): 10 INJECTION INTRAVENOUS at 11:03

## 2021-01-01 RX ADMIN — ZINC SULFATE TAB 220 MG (50 MG ZINC EQUIVALENT) 220 MILLIGRAM(S): 220 (50 ZN) TAB at 12:33

## 2021-01-01 RX ADMIN — ALBUTEROL 1 PUFF(S): 90 AEROSOL, METERED ORAL at 05:58

## 2021-01-01 RX ADMIN — Medication 20 MILLIEQUIVALENT(S): at 17:13

## 2021-01-01 RX ADMIN — ALBUTEROL 1 PUFF(S): 90 AEROSOL, METERED ORAL at 11:02

## 2021-01-01 RX ADMIN — Medication 400 MILLIGRAM(S): at 23:05

## 2021-01-01 RX ADMIN — AMLODIPINE BESYLATE 10 MILLIGRAM(S): 2.5 TABLET ORAL at 05:20

## 2021-01-01 RX ADMIN — HEPARIN SODIUM 5000 UNIT(S): 5000 INJECTION INTRAVENOUS; SUBCUTANEOUS at 22:07

## 2021-01-01 RX ADMIN — ALBUTEROL 1 PUFF(S): 90 AEROSOL, METERED ORAL at 17:13

## 2021-01-01 RX ADMIN — Medication 6 MILLIGRAM(S): at 05:21

## 2021-01-01 RX ADMIN — Medication 50 MILLIGRAM(S): at 12:45

## 2021-01-01 RX ADMIN — Medication 50 MILLIGRAM(S): at 05:24

## 2021-01-01 RX ADMIN — Medication 650 MILLIGRAM(S): at 22:07

## 2021-01-01 RX ADMIN — SEVELAMER CARBONATE 800 MILLIGRAM(S): 2400 POWDER, FOR SUSPENSION ORAL at 17:09

## 2021-01-01 RX ADMIN — SODIUM CHLORIDE 60 MILLILITER(S): 9 INJECTION, SOLUTION INTRAVENOUS at 14:18

## 2021-01-01 RX ADMIN — MONTELUKAST 10 MILLIGRAM(S): 4 TABLET, CHEWABLE ORAL at 11:20

## 2021-01-01 RX ADMIN — Medication 50 MILLIGRAM(S): at 05:25

## 2021-01-01 RX ADMIN — HEPARIN SODIUM 5000 UNIT(S): 5000 INJECTION INTRAVENOUS; SUBCUTANEOUS at 21:59

## 2021-01-01 RX ADMIN — Medication 2000 UNIT(S): at 12:05

## 2021-01-01 RX ADMIN — Medication 50 MILLIGRAM(S): at 22:07

## 2021-01-01 RX ADMIN — SODIUM CHLORIDE 60 MILLILITER(S): 9 INJECTION, SOLUTION INTRAVENOUS at 15:26

## 2021-01-01 RX ADMIN — LATANOPROST 1 DROP(S): 0.05 SOLUTION/ DROPS OPHTHALMIC; TOPICAL at 22:00

## 2021-01-01 RX ADMIN — Medication 2000 UNIT(S): at 12:33

## 2021-01-01 RX ADMIN — ALBUTEROL 1 PUFF(S): 90 AEROSOL, METERED ORAL at 05:26

## 2021-01-01 RX ADMIN — SODIUM CHLORIDE 60 MILLILITER(S): 9 INJECTION, SOLUTION INTRAVENOUS at 09:29

## 2021-01-01 RX ADMIN — Medication 650 MILLIGRAM(S): at 23:58

## 2021-01-01 RX ADMIN — ALBUTEROL 1 PUFF(S): 90 AEROSOL, METERED ORAL at 21:59

## 2021-01-01 RX ADMIN — Medication 50 MILLIGRAM(S): at 13:11

## 2021-01-01 RX ADMIN — Medication 650 MILLIGRAM(S): at 05:18

## 2021-01-01 RX ADMIN — LATANOPROST 1 DROP(S): 0.05 SOLUTION/ DROPS OPHTHALMIC; TOPICAL at 23:57

## 2021-01-01 RX ADMIN — Medication 50 MILLIGRAM(S): at 21:59

## 2021-01-01 RX ADMIN — Medication 650 MILLIGRAM(S): at 05:26

## 2021-01-01 RX ADMIN — PANTOPRAZOLE SODIUM 40 MILLIGRAM(S): 20 TABLET, DELAYED RELEASE ORAL at 06:19

## 2021-01-01 RX ADMIN — Medication 50 MILLIGRAM(S): at 14:51

## 2021-01-01 RX ADMIN — AMLODIPINE BESYLATE 10 MILLIGRAM(S): 2.5 TABLET ORAL at 06:18

## 2021-01-01 RX ADMIN — HEPARIN SODIUM 5000 UNIT(S): 5000 INJECTION INTRAVENOUS; SUBCUTANEOUS at 23:58

## 2021-01-01 RX ADMIN — ALBUTEROL 1 PUFF(S): 90 AEROSOL, METERED ORAL at 11:15

## 2021-01-01 RX ADMIN — ALBUTEROL 1 PUFF(S): 90 AEROSOL, METERED ORAL at 12:44

## 2021-01-01 RX ADMIN — MONTELUKAST 10 MILLIGRAM(S): 4 TABLET, CHEWABLE ORAL at 15:26

## 2021-01-01 RX ADMIN — ALBUTEROL 1 PUFF(S): 90 AEROSOL, METERED ORAL at 15:26

## 2021-01-01 RX ADMIN — Medication 6 MILLIGRAM(S): at 06:19

## 2021-01-01 RX ADMIN — ALBUTEROL 1 PUFF(S): 90 AEROSOL, METERED ORAL at 17:09

## 2021-01-01 RX ADMIN — Medication 50 MILLIGRAM(S): at 22:00

## 2021-01-01 RX ADMIN — SEVELAMER CARBONATE 800 MILLIGRAM(S): 2400 POWDER, FOR SUSPENSION ORAL at 17:12

## 2021-01-01 RX ADMIN — ALBUTEROL 1 PUFF(S): 90 AEROSOL, METERED ORAL at 14:17

## 2021-01-01 RX ADMIN — Medication 50 MILLIGRAM(S): at 05:20

## 2021-01-01 RX ADMIN — ALBUTEROL 1 PUFF(S): 90 AEROSOL, METERED ORAL at 23:21

## 2021-01-01 RX ADMIN — FAMOTIDINE 20 MILLIGRAM(S): 10 INJECTION INTRAVENOUS at 11:58

## 2021-01-01 RX ADMIN — Medication 6 MILLIGRAM(S): at 05:25

## 2021-01-01 RX ADMIN — Medication 50 MILLIGRAM(S): at 21:55

## 2021-01-01 RX ADMIN — ALBUTEROL 1 PUFF(S): 90 AEROSOL, METERED ORAL at 16:01

## 2021-01-01 RX ADMIN — Medication 100 MILLIGRAM(S): at 23:57

## 2021-01-01 RX ADMIN — HEPARIN SODIUM 5000 UNIT(S): 5000 INJECTION INTRAVENOUS; SUBCUTANEOUS at 14:17

## 2021-01-01 RX ADMIN — Medication 6 MILLIGRAM(S): at 06:54

## 2021-01-01 RX ADMIN — Medication 650 MILLIGRAM(S): at 14:18

## 2021-01-01 RX ADMIN — SODIUM CHLORIDE 60 MILLILITER(S): 9 INJECTION, SOLUTION INTRAVENOUS at 17:24

## 2021-01-01 RX ADMIN — ALBUTEROL 1 PUFF(S): 90 AEROSOL, METERED ORAL at 13:12

## 2021-01-01 RX ADMIN — MONTELUKAST 10 MILLIGRAM(S): 4 TABLET, CHEWABLE ORAL at 11:04

## 2021-01-01 RX ADMIN — Medication 650 MILLIGRAM(S): at 06:51

## 2021-01-01 RX ADMIN — ALBUTEROL 1 PUFF(S): 90 AEROSOL, METERED ORAL at 05:57

## 2021-01-01 RX ADMIN — SEVELAMER CARBONATE 800 MILLIGRAM(S): 2400 POWDER, FOR SUSPENSION ORAL at 05:19

## 2021-01-01 RX ADMIN — Medication 500 MILLIGRAM(S): at 11:22

## 2021-01-01 RX ADMIN — FAMOTIDINE 20 MILLIGRAM(S): 10 INJECTION INTRAVENOUS at 11:21

## 2021-01-01 RX ADMIN — Medication 6 MILLIGRAM(S): at 05:57

## 2021-01-01 RX ADMIN — AMLODIPINE BESYLATE 10 MILLIGRAM(S): 2.5 TABLET ORAL at 06:52

## 2021-01-01 RX ADMIN — ALBUTEROL 1 PUFF(S): 90 AEROSOL, METERED ORAL at 23:58

## 2021-01-01 RX ADMIN — Medication 30 MILLIGRAM(S): at 12:07

## 2021-01-01 RX ADMIN — HYDROMORPHONE HYDROCHLORIDE 0.25 MILLIGRAM(S): 2 INJECTION INTRAMUSCULAR; INTRAVENOUS; SUBCUTANEOUS at 05:57

## 2021-01-01 RX ADMIN — SEVELAMER CARBONATE 800 MILLIGRAM(S): 2400 POWDER, FOR SUSPENSION ORAL at 09:28

## 2021-01-01 RX ADMIN — ZINC SULFATE TAB 220 MG (50 MG ZINC EQUIVALENT) 220 MILLIGRAM(S): 220 (50 ZN) TAB at 12:05

## 2021-01-01 RX ADMIN — Medication 650 MILLIGRAM(S): at 12:05

## 2021-01-01 RX ADMIN — ALBUTEROL 1 PUFF(S): 90 AEROSOL, METERED ORAL at 03:36

## 2021-01-01 RX ADMIN — Medication 650 MILLIGRAM(S): at 12:36

## 2021-01-01 RX ADMIN — ALBUTEROL 1 PUFF(S): 90 AEROSOL, METERED ORAL at 19:20

## 2021-01-01 RX ADMIN — Medication 2000 UNIT(S): at 11:20

## 2021-01-01 RX ADMIN — SEVELAMER CARBONATE 800 MILLIGRAM(S): 2400 POWDER, FOR SUSPENSION ORAL at 16:01

## 2021-01-01 RX ADMIN — Medication 50 MILLIGRAM(S): at 12:35

## 2021-01-01 RX ADMIN — Medication 30 MILLIGRAM(S): at 06:19

## 2021-01-01 RX ADMIN — Medication 600 MILLIGRAM(S): at 17:09

## 2021-01-01 RX ADMIN — HEPARIN SODIUM 5000 UNIT(S): 5000 INJECTION INTRAVENOUS; SUBCUTANEOUS at 05:25

## 2021-01-01 RX ADMIN — AMLODIPINE BESYLATE 10 MILLIGRAM(S): 2.5 TABLET ORAL at 05:25

## 2021-01-01 RX ADMIN — SEVELAMER CARBONATE 800 MILLIGRAM(S): 2400 POWDER, FOR SUSPENSION ORAL at 11:01

## 2021-01-01 RX ADMIN — Medication 100 MILLIGRAM(S): at 22:06

## 2021-01-01 RX ADMIN — ALBUTEROL 1 PUFF(S): 90 AEROSOL, METERED ORAL at 10:32

## 2021-01-01 RX ADMIN — Medication 100 MILLIGRAM(S): at 21:59

## 2021-01-01 RX ADMIN — LATANOPROST 1 DROP(S): 0.05 SOLUTION/ DROPS OPHTHALMIC; TOPICAL at 22:08

## 2021-01-01 RX ADMIN — ALBUTEROL 1 PUFF(S): 90 AEROSOL, METERED ORAL at 03:00

## 2021-01-01 RX ADMIN — Medication 500 MILLIGRAM(S): at 11:02

## 2021-01-01 RX ADMIN — ZINC SULFATE TAB 220 MG (50 MG ZINC EQUIVALENT) 220 MILLIGRAM(S): 220 (50 ZN) TAB at 11:04

## 2021-01-01 RX ADMIN — Medication 500 MILLIGRAM(S): at 12:07

## 2021-01-01 RX ADMIN — Medication 50 MILLIGRAM(S): at 23:58

## 2021-01-01 RX ADMIN — Medication 650 MILLIGRAM(S): at 22:01

## 2021-01-12 NOTE — ED PROVIDER NOTE - PROGRESS NOTE DETAILS
discussed with ICU attending, Dr. Galarza and will come evaluate Evaluated by ICU, and stable for admission to tele.

## 2021-01-12 NOTE — ED ADULT NURSE NOTE - OBJECTIVE STATEMENT
shortness of breath, low O2 saturation at home, COVID positive, lives with wife. No wheezing, fever, no coughing. currently tolerating non-rebreather.

## 2021-01-12 NOTE — CONSULT NOTE ADULT - ASSESSMENT
89 year old Setswana male with a PMHx of HTN, HLD, CKD, and Gout and no significant PSHx presents to ED c/o worsening SOB since this morning. . Pt was recently discharged from [facility] on 1/5/21 where he tested positive for covid-19. Pt was d/c on Dexamethasone and Eliquis. Denies fevers, nausea, emesis, chest pain, abdominal pain, dysuria, hematuria, diarrhea, constipation, or any other acute complaints. NKDA . ICU was consulted as pt was hypoxic and on NRB 15 L . Pt was seen at bedside and was saturating 97 % on RA .   ABG was noted   Likely 2/2 COVID  CXR : Bilateral lung parenchymal infiltrates identified with appearance suggestive for atypical viral pneumonia/Covid 19.  EKG : NSR  Pt is Full code .     1) Problem : Acute Hypoxic respiratory failure  - p/w worsening SOB   - PE : B/L expiatory wheezing with rhonchi  , short of breath,   - CXR : Bilateral lung parenchymal infiltrates identified with appearance suggestive for atypical viral pneumonia/Covid 19.  - Leukocytosis 9 K   - pt is afebrile  - ABG WNL   - Lactate 1.2  - pt was hypoxic and on NRB 15 L . Pt was seen at bedside and was saturating 97 % on RA .   - contact and airborne isolation precaution , Aspiration Precautions .  - would recommend to start on decadron given requirments of O2   - prone therapy for lung recruitment if needed   - can not get CTA to r/o PE given CKD  - would recommend to get D-Dimers and if elevated start Anticoagulation   - can not get V/Q scan given no Alen CXR to compare with    - would recommend to start Antibioticcs   - start on vitamin d 50,000 U  - Supportive care , AntPyeretic Tylenol PRN   - Not a candidate for Remdesvir giving low eGFR  - f/u RVP   - f/u Bl Cx   - f/u CXR AM  - f/u Procalcitonin    - f/u Coags , D-Dimers , ESR , CRP , LDH, ferritin , Cardiac enzymes   ** (please get CRP daily and get ESR ,D-Dimers , LDH, ferritin , Cardiac enzymes , Coags every 3 days)   Pt to remian on medical floor annd consult ICU if needed

## 2021-01-12 NOTE — ED PROVIDER NOTE - OBJECTIVE STATEMENT
89 y/o M pt with a PMHx of HTN, HLD, CKF, and Gout and no significant PSHx presents to ED c/o worsening SOB since this morning. Hx obtained from sonBret ([phone]). Pt was recently discharged from [facility] on 1/5/21 where he tested positive for covid-19. Pt was d/c on Dexamethasone and Eliquis. Pt currently on nonrebreather. Denies fevers, nausea, emesis, chest pain, abdominal pain, dysuria, hematuria, diarrhea, constipation, or any other acute complaints. NKDA.

## 2021-01-12 NOTE — H&P ADULT - ASSESSMENT
89 year old Syrian male with medical history significant for HTN, HLD, CKD, and Gout and no significant surgical history presents to ED c/o worsening SOB since this morning.    ED:  ICU was consulted as pt was hypoxic and on NRB 15 L . Pt was seen at bedside and was saturating 97 % on NRB.   ABG was noted, Likely 2/2 COVID  CXR : Bilateral lung parenchymal infiltrates identified with appearance suggestive for atypical viral pneumonia/Covid 19.  EKG : NSR      Patient is being admitted to [facility] for acute hypoxic respiratory failure 2/2 covid and elevated troponin likely 2/2 demand ischemia.

## 2021-01-12 NOTE — CONSULT NOTE ADULT - SUBJECTIVE AND OBJECTIVE BOX
89y  Male  No Known Allergies    Patient is a 89y old  Male who presents with a chief complaint of   HPI:    PAST MEDICAL & SURGICAL HISTORY:  Gout    CKD (chronic kidney disease)    HLD (hyperlipidemia)    HTN (hypertension)    No significant past surgical history      FAMILY HISTORY:    MEDICATIONS  (STANDING):  ALBUTerol    90 MICROgram(s) HFA Inhaler 1 Puff(s) Inhalation every 4 hours  ascorbic acid 500 milliGRAM(s) Oral daily  cholecalciferol 2000 Unit(s) Oral daily  dexAMETHasone  Injectable 6 milliGRAM(s) IV Push daily  furosemide   Injectable 40 milliGRAM(s) IV Push once    MEDICATIONS  (PRN):  acetaminophen   Tablet .. 650 milliGRAM(s) Oral every 6 hours PRN Temp greater or equal to 38C (100.4F)      Vital Signs Last 24 Hrs  T(C): 36.5 (12 Jan 2021 18:41), Max: 37.3 (12 Jan 2021 14:32)  T(F): 97.7 (12 Jan 2021 18:41), Max: 99.2 (12 Jan 2021 14:32)  HR: 82 (12 Jan 2021 18:41) (82 - 87)  BP: 174/58 (12 Jan 2021 18:41) (167/63 - 174/58)  BP(mean): --  RR: 17 (12 Jan 2021 18:41) (17 - 17)  SpO2: 97% (12 Jan 2021 18:41) (97% - 98%)  CBC Full  -  ( 12 Jan 2021 16:37 )  WBC Count : 9.05 K/uL  RBC Count : 3.22 M/uL  Hemoglobin : 9.6 g/dL  Hematocrit : 30.5 %  Platelet Count - Automated : 101 K/uL  Mean Cell Volume : 94.7 fl  Mean Cell Hemoglobin : 29.8 pg  Mean Cell Hemoglobin Concentration : 31.5 gm/dL  Auto Neutrophil # : 8.28 K/uL  Auto Lymphocyte # : 0.53 K/uL  Auto Monocyte # : 0.17 K/uL  Auto Eosinophil # : 0.00 K/uL  Auto Basophil # : 0.01 K/uL  Auto Neutrophil % : 91.4 %  Auto Lymphocyte % : 5.9 %  Auto Monocyte % : 1.9 %  Auto Eosinophil % : 0.0 %  Auto Basophil % : 0.1 %    PT/INR - ( 12 Jan 2021 16:37 )   PT: 19.4 sec;   INR: 1.67 ratio         PTT - ( 12 Jan 2021 16:37 )  PTT:29.2 sec  01-12    143  |  109<H>  |  167<H>  ----------------------------<  132<H>  3.6   |  19<L>  |  4.99<H>    Ca    8.4      12 Jan 2021 16:37    TPro  6.5  /  Alb  2.7<L>  /  TBili  0.9  /  DBili  x   /  AST  23  /  ALT  39  /  AlkPhos  77  01-12      REVIEW OF SYSTEMS      General:	    Skin/Breast:  	  Ophthalmologic:  	  ENMT:	    Respiratory and Thorax:  	  Cardiovascular:	    Gastrointestinal:	    Genitourinary:	    Musculoskeletal:	    Neurological:	    Psychiatric:	    Hematology/Lymphatics:	    Endocrine:	    Allergic/Immunologic:	    Physicial Exam:     Constitutional: NAD, well-groomed, well-developed  HEENT: PERRLA, EOMI, no drainage or redness  Neck: No bruits; no thyromegaly or nodules,  No JVD  Back: Normal spine flexure, No CVA tenderness, No deformity or limitation of movement  Respiratory: Breath Sounds equal & clear to percussion & auscultation, no accessory muscle use  Cardiovascular: Regular rate & rhythm, normal S1, S2; no murmurs, gallops or rubs; no S3, S4  Gastrointestinal: Soft, non-tender, non distended no hepatosplenomegaly, normal bowel sounds  Extremities: No peripheral edema, No cyanosis, clubbing   Vascular: Equal and normal pulses: 2+ peripheral pulses throughout  Neurological: GCS:    A&O x 3; no sensory, motor  deficits, normal reflexes  Psychiatric: Normal mood, normal affect  Musculoskeletal: No joint pain, swelling or deformity; no limitation of movement  Skin: No rashes

## 2021-01-12 NOTE — ED ADULT NURSE NOTE - NSIMPLEMENTINTERV_GEN_ALL_ED
Implemented All Fall with Harm Risk Interventions:  [place] to call system. Call bell, personal items and telephone within reach. Instruct patient to call for assistance. Room bathroom lighting operational. Non-slip footwear when patient is off stretcher. Physically safe environment: no spills, clutter or unnecessary equipment. Stretcher in lowest position, wheels locked, appropriate side rails in place. Provide visual cue, wrist band, yellow gown, etc. Monitor gait and stability. Monitor for mental status changes and reorient to person, place, and time. Review medications for side effects contributing to fall risk. Reinforce activity limits and safety measures with patient and family. Provide visual clues: red socks.

## 2021-01-12 NOTE — H&P ADULT - NSICDXPASTMEDICALHX_GEN_ALL_CORE_FT
PAST MEDICAL HISTORY:  CKD (chronic kidney disease)     Gout     HLD (hyperlipidemia)     HTN (hypertension)

## 2021-01-12 NOTE — H&P ADULT - ATTENDING COMMENTS
89 y/o M pt with a PMHx of HTN, HLD, CKD, and Gout and no significant PSHx presents to ED c/o worsening SOB since this morning. Hx obtained from son, Bret ([phone]). Pt was recently discharged from [facility] on 1/5/21 where he tested positive for covid-19. Pt was d/c on Dexamethasone and Eliquis. Denies fevers, nausea, emesis, chest pain, abdominal pain, dysuria, hematuria, diarrhea, constipation, or any other acute complaints. NKDA    assessment  --  hypoxia 2nd to pneumonia 2nd to covid 19, r/o acute chf, r/o acs, h/o HTN, HLD, CKD, and Gout    plan  --  admit to tele, acs protocol, decadron, vit c, vitamin d, zinc, pepcid, singulair, contact and airborne isolation, cont albuterol inhaler, cont supportive care with tylenol prn, robitussin prn and O2 via n/c, as needed    covid-19 antibody test, procalcitonin, D-dimer, esr, crp, ldh, ferritin, lactate, ce q8 x3, cbc, bmp, mg, phos, lipids, tsh, bld cx, ua, ucx    echo    cardio cons  pulm cons  renal cons

## 2021-01-12 NOTE — H&P ADULT - PROBLEM SELECTOR PLAN 1
Patient presented respiratory distress  COVID positive  OE has diffuse crackles and ronchi  pedal edema 1+  Desaturating on RA, also has moderate respiratory distress with increased work of breathing  Saturation improved to 99 on NRB  Pro BNP elevated  Started on decadron  started on albuterol  O2 supplementation as needed  Monitor respiratory status  f/u markers  f/u d-dimer

## 2021-01-12 NOTE — H&P ADULT - NSHPPHYSICALEXAM_GEN_ALL_CORE
Vital Signs (24 Hrs):  T(C): 36.5 (01-12-21 @ 18:41), Max: 37.3 (01-12-21 @ 14:32)  HR: 82 (01-12-21 @ 18:41) (82 - 87)  BP: 174/58 (01-12-21 @ 18:41) (167/63 - 174/58)  RR: 17 (01-12-21 @ 18:41) (17 - 17)  SpO2: 97% (01-12-21 @ 18:41) (97% - 98%)

## 2021-01-12 NOTE — H&P ADULT - PROBLEM SELECTOR PLAN 3
Has elevated troponin on admission  No chest pain at present  Likely demand ischemia  Also has elevated troponin  Pro BNP is also elevated  Likely patient has CHF  f/u echo  Trend trop  Monitor on tele  on statin and bb  f/u echo

## 2021-01-12 NOTE — H&P ADULT - HISTORY OF PRESENT ILLNESS
89 year old Turkish male with medical history significant for HTN, HLD, CKD, and Gout and no significant surgical history presents to ED c/o worsening SOB since this morning. Pt was recently discharged from [facility] on 1/5/21 where he tested positive for covid-19. Pt was d/c on Dexamethasone and Eliquis. He states that he was having increasing difficulty breathing, even on minimal ambulation. Also started having bleed from nose and gums. States that it started today, and is mild amount of blood loss. Denies fevers, nausea, emesis, chest pain, abdominal pain, dysuria, hematuria, diarrhea, constipation, or any other acute complaints. NKDA .     ED: ICU was consulted as pt was hypoxic and on NRB 15 L, initially. Patient was given dose of decadron. His respiratory status improved, saturating 99% on NRB. So patient was downgraded to medicine. n/a

## 2021-01-12 NOTE — ED PROVIDER NOTE - CLINICAL SUMMARY MEDICAL DECISION MAKING FREE TEXT BOX
89 yo M with SOB. known covid positive. Recently discharged from [facility] for covid. CXR with b/l infiltrates. Patient 95-96% on 6L NC. Placed on NRB for comfort. Will admit for further management of covid. Evaluated by ICU and stable for admission to the floor. Endorsed to Dr. Kraft.

## 2021-01-12 NOTE — CONSULT NOTE ADULT - ATTENDING COMMENTS
89 year old Telugu male with a PMHx of HTN, HLD, CKD, and Gout presents to ED c/o worsening SOB since this morning. . Pt was recently discharged from [facility] on 1/5/21 where he tested positive for covid-19. Pt was d/c on Dexamethasone and Eliquis. Denies fevers, nausea, emesis, chest pain, abdominal pain, dysuria, hematuria, diarrhea, constipation, or any other acute complaints. NKDA . ICU was consulted as pt was hypoxic and on NRB 15 L . Pt was seen at bedside and was saturating 97 % on RA .   CXR : Bilateral lung parenchymal infiltrates identified with appearance suggestive for atypical viral pneumonia/Covid 19.  EKG : NSR    1) Problem : Acute Hypoxic respiratory failure 2/2 BL Multifocal viral PNA likely 2/2 COVID  - p/w worsening SOB   - contact and airborne isolation precaution , Aspiration Precautions .  - would recommend to start on decadron given requirements of O2   - prone therapy for lung recruitment as tolerated  - would recommend to get D-Dimers and if elevated start Anticoagulation   - would recommend to start Antibiotics given recent hospitalization, Check Procalcitonin  - f/u RVP   - f/u Bl Cx   -Check COVID Inflammatory markers  Pt to remain on medical floor and consult ICU if needed

## 2021-01-12 NOTE — ED PROVIDER NOTE - CHPI ED SYMPTOMS NEG
no nausea, no emesis, no chest pain, no SOB, no abd pain, no dysuria, no hematuria, no diarrhea, no constipation/no fever

## 2021-01-12 NOTE — ED ADULT NURSE NOTE - ED STAT RN HANDOFF DETAILS
received pt.in bed at 0300 from rn bretune.pt.is awake and responsive.denies pain. on % TRANSFER TO RM 50b report given to sebastien mendoza. no acute distress noted

## 2021-01-12 NOTE — ED PROVIDER NOTE - CARE PLAN
Principal Discharge DX:	Pneumonia  Secondary Diagnosis:	Coronavirus infection   Principal Discharge DX:	Coronavirus infection  Secondary Diagnosis:	Pneumonia

## 2021-01-13 NOTE — CONSULT NOTE ADULT - ASSESSMENT
89 year old Guyanese male with medical history significant for HTN, HLD, CKD, and Gout with SOB,COVID pneumonia.  1.Tele monitoring.  2.COVID+-dexamathasone.  3.HTN-cont bp medication.  4.CRI-Renal eval.  5.Lipid d/o-statin.  6.GI and DVT prophylaxis.

## 2021-01-13 NOTE — PROGRESS NOTE ADULT - SUBJECTIVE AND OBJECTIVE BOX
pt seen in icu [  ], reg med floor [ x  ], bed [ x], chair at bedside [   ], a+o x3 [ x ], lethargic [  ],  nad [ x ]    fay [  ], ngt [  ], peg [  ], et tube [  ], cent line [  ], picc line [  ]        Allergies    No Known Allergies        Vitals    T(F): 98.2 (01-13-21 @ 11:24), Max: 99.2 (01-12-21 @ 14:32)  HR: 89 (01-13-21 @ 11:24) (82 - 92)  BP: 146/53 (01-13-21 @ 11:24) (133/45 - 175/113)  RR: 20 (01-13-21 @ 11:24) (17 - 20)  SpO2: 99% (01-13-21 @ 11:24) (97% - 100%)  Wt(kg): --  CAPILLARY BLOOD GLUCOSE          Labs                          9.0    8.03  )-----------( 109      ( 13 Jan 2021 07:25 )             28.8       01-13    140  |  107  |  174<H>  ----------------------------<  234<H>  3.9   |  17<L>  |  5.06<H>    Ca    8.2<L>      13 Jan 2021 07:25  Phos  7.2     01-13  Mg     1.9     01-13    TPro  6.0  /  Alb  2.4<L>  /  TBili  0.7  /  DBili  x   /  AST  17  /  ALT  32  /  AlkPhos  69  01-13      CARDIAC MARKERS ( 13 Jan 2021 04:48 )  0.059 ng/mL / x     / 25 U/L / x     / 1.8 ng/mL  CARDIAC MARKERS ( 12 Jan 2021 16:37 )  0.069 ng/mL / x     / x     / x     / x          Respiratory Viral Panel with COVID-19 by POLI (01.12.21 @ 16:39)   Rapid RVP Result: Detected   SARS-CoV-2: Detected:      Radiology Results  < from: Xray Chest 1 View-PORTABLE IMMEDIATE (01.12.21 @ 15:15) >  IMPRESSION: Bilateral lung parenchymal infiltrates identified with appearance suggestive for atypical viral pneumonia/Covid 19.    < end of copied text >      Meds    MEDICATIONS  (STANDING):  ALBUTerol    90 MICROgram(s) HFA Inhaler 1 Puff(s) Inhalation every 4 hours  allopurinol 100 milliGRAM(s) Oral at bedtime  amLODIPine   Tablet 10 milliGRAM(s) Oral daily  ascorbic acid 500 milliGRAM(s) Oral daily  cholecalciferol 2000 Unit(s) Oral daily  dexAMETHasone  Injectable 6 milliGRAM(s) IV Push daily  hydrALAZINE 50 milliGRAM(s) Oral three times a day  metoprolol tartrate 50 milliGRAM(s) Oral three times a day  pantoprazole    Tablet 40 milliGRAM(s) Oral before breakfast  sevelamer carbonate 800 milliGRAM(s) Oral two times a day  sodium bicarbonate 650 milliGRAM(s) Oral daily  torsemide 30 milliGRAM(s) Oral <User Schedule>  zinc sulfate 220 milliGRAM(s) Oral daily      MEDICATIONS  (PRN):  acetaminophen   Tablet .. 650 milliGRAM(s) Oral every 6 hours PRN Temp greater or equal to 38C (100.4F)  guaiFENesin  milliGRAM(s) Oral every 12 hours PRN Cough      Physical Exam    Neuro :  no focal deficits  Respiratory: good air entry   CV: RRR, S1S2, no murmurs,   Abdominal: Soft, NT, ND +BS,  Extremities: No edema, + peripheral pulses

## 2021-01-13 NOTE — PROGRESS NOTE ADULT - SUBJECTIVE AND OBJECTIVE BOX
PULMONARY CONSULT NOTE      CRISTINA LOUIS  MRN-[id number]    Patient is a 89y old  Male who presents with a chief complaint of respiratory distress (2021 12:56)    History of Present Illness:  Reason for Admission: respiratory distress  History of Present Illness:   89 year old Armenian male with medical history significant for HTN, HLD, CKD, and Gout and no significant surgical history presents to ED c/o worsening SOB since this morning. Pt was recently discharged from [facility] on 21 where he tested positive for covid-19. Pt was d/c on Dexamethasone and Eliquis. He states that he was having increasing difficulty breathing, even on minimal ambulation. Also started having bleed from nose and gums. States that it started today, and is mild amount of blood loss. Denies fevers, nausea, emesis, chest pain, abdominal pain, dysuria, hematuria, diarrhea, constipation, or any other acute complaints. NKDA .     ED: ICU was consulted as pt was hypoxic and on NRB 15 L, initially. Patient was given dose of decadron. His respiratory status improved, saturating 99% on NRB. So patient was downgraded to medicine.          HISTORY OF PRESENT ILLNESS: As above. Awake, alert, comfortable in bed in NAD    MEDICATIONS  (STANDING):  ALBUTerol    90 MICROgram(s) HFA Inhaler 1 Puff(s) Inhalation every 4 hours  allopurinol 100 milliGRAM(s) Oral at bedtime  amLODIPine   Tablet 10 milliGRAM(s) Oral daily  ascorbic acid 500 milliGRAM(s) Oral daily  cholecalciferol 2000 Unit(s) Oral daily  dexAMETHasone  Injectable 6 milliGRAM(s) IV Push daily  hydrALAZINE 50 milliGRAM(s) Oral three times a day  metoprolol tartrate 50 milliGRAM(s) Oral three times a day  pantoprazole    Tablet 40 milliGRAM(s) Oral before breakfast  sevelamer carbonate 800 milliGRAM(s) Oral two times a day  sodium bicarbonate 650 milliGRAM(s) Oral daily  torsemide 30 milliGRAM(s) Oral <User Schedule>  zinc sulfate 220 milliGRAM(s) Oral daily      MEDICATIONS  (PRN):  acetaminophen   Tablet .. 650 milliGRAM(s) Oral every 6 hours PRN Temp greater or equal to 38C (100.4F)  guaiFENesin  milliGRAM(s) Oral every 12 hours PRN Cough      Allergies    No Known Allergies    Intolerances        PAST MEDICAL & SURGICAL HISTORY:  Gout    CKD (chronic kidney disease)    HLD (hyperlipidemia)    HTN (hypertension)    No significant past surgical history        FAMILY HISTORY:      SOCIAL HISTORY  Smoking History:     REVIEW OF SYSTEMS:    CONSTITUTIONAL:  No fevers, chills, sweats    HEENT:  Eyes:  No diplopia or blurred vision. ENT:  No earache, sore throat or runny nose.    CARDIOVASCULAR:  No pressure, squeezing, tightness, or heaviness about the chest; no palpitations.    RESPIRATORY:  Per HPI    GASTROINTESTINAL:  No abdominal pain, nausea, vomiting or diarrhea.    GENITOURINARY:  No dysuria, frequency or urgency.    NEUROLOGIC:  No paresthesias, fasciculations, seizures or weakness.    PSYCHIATRIC:  No disorder of thought or mood.    Vital Signs Last 24 Hrs  T(C): 36.8 (2021 11:24), Max: 37.3 (2021 14:32)  T(F): 98.2 (2021 11:24), Max: 99.2 (2021 14:32)  HR: 89 (2021 11:24) (82 - 92)  BP: 146/53 (2021 11:24) (133/45 - 175/113)  BP(mean): --  RR: 20 (2021 11:24) (17 - 20)  SpO2: 99% (2021 11:24) (97% - 100%)  I&O's Detail      PHYSICAL EXAMINATION:    GENERAL: The patient is a well-developed, well-nourished _____in no apparent distress.     HEENT: Head is normocephalic and atraumatic. Extraocular muscles are intact. Mucous membranes are moist.     NECK: Supple.     LUNGS: Clear to auscultation without wheezing, rales, or rhonchi. Respirations unlabored    HEART: Regular rate and rhythm without murmur.    ABDOMEN: Soft, nontender, and nondistended.  No hepatosplenomegaly is noted.    EXTREMITIES: Without any cyanosis, clubbing, rash, lesions or edema.    NEUROLOGIC: Grossly intact.      LABS:                        9.0    8.03  )-----------( 109      ( 2021 07:25 )             28.8         140  |  107  |  174<H>  ----------------------------<  234<H>  3.9   |  17<L>  |  5.06<H>    Ca    8.2<L>      2021 07:25  Phos  7.2       Mg     1.9         TPro  6.0  /  Alb  2.4<L>  /  TBili  0.7  /  DBili  x   /  AST  17  /  ALT  32  /  AlkPhos  69      PT/INR - ( 2021 07:25 )   PT: 19.9 sec;   INR: 1.72 ratio         PTT - ( 2021 16:37 )  PTT:29.2 sec  Urinalysis Basic - ( 2021 00:14 )    Color: Yellow / Appearance: Clear / S.015 / pH: x  Gluc: x / Ketone: Negative  / Bili: Negative / Urobili: Negative   Blood: x / Protein: 100 / Nitrite: Negative   Leuk Esterase: Trace / RBC: 10-25 /HPF / WBC 3-5 /HPF   Sq Epi: x / Non Sq Epi: Few /HPF / Bacteria: Few /HPF      ABG - ( 2021 16:36 )  pH, Arterial: 7.40  pH, Blood: x     /  pCO2: 31    /  pO2: 128   / HCO3: 19    / Base Excess: -4.8  /  SaO2: 99        SARS-CoV-2: Detected: This Respiratory Panel uses polymerase chain reaction (PCR) to detect for   adenovirus; coronavirus (HKU1, NL63, 229E, OC43); human metapneumovirus   (hMPV); human enterovirus/rhinovirus (Entero/RV); influenza A; influenza   A/H1; influenza A/H3; influenza A/H1-2009; influenza B; parainfluenza   viruses 1, 2, 3, 4; respiratory syncytial virus; Mycoplasma pneumoniae;   Chlamydophila pneumoniae; and SARS-CoV-2. (21 @ 16:39)         CARDIAC MARKERS ( 2021 04:48 )  0.059 ng/mL / x     / 25 U/L / x     / 1.8 ng/mL  CARDIAC MARKERS ( 2021 16:37 )  0.069 ng/mL / x     / x     / x     / x            Serum Pro-Brain Natriuretic Peptide: 9057 pg/mL (21 @ 16:37)    Lactate, Blood: 1.2 mmol/L (21 @ 16:38)        MICROBIOLOGY:    RADIOLOGY & ADDITIONAL STUDIES:    CXR:  < from: Xray Chest 1 View-PORTABLE IMMEDIATE (21 @ 15:15) >  IMPRESSION: Bilateral lung parenchymal infiltrates identified with appearance suggestive for atypical viral pneumonia/Covid 19.    < end of copied text >    Ct scan chest:    ekg;    echo:

## 2021-01-13 NOTE — PROGRESS NOTE ADULT - PROBLEM SELECTOR PLAN 1
isolation precautions  oxygen supp  monitor oxygen sat  check LDH. LFTs, CRP, D-Dimer, Ferritin and procalcitonin  Vit C, D and zinc supp  montelukast 10 mgs po  Steroids IV

## 2021-01-13 NOTE — PROGRESS NOTE ADULT - ASSESSMENT
1. Hypoxic Respiratory Failure 2nd to PNA/Covid   - Covid detected.   - Check IGG   - CXR noted - b/l infiltrates   - Vit C, Vit D, Zinc   - Continue Dexamethasone    - Continue Singulair and Pepcid   - Robitussin PRN for cough  - Tylenol PRN for temp   - O2 Supp - on NRM - taper as feasible   - Monitor O2 Sat.  - Monitor labs  - F.U CXR  - Unable to obtain VQ or CT given CKD   - Check labs: ferritin, ddimer, ESR, LDH, Procalcitonin, CRP and lactate  - Prone positioning as tolerated   - Pulm consult   - Isolation precautions   - DVT and GI PPX     2. CKD   - Creat 5.06  - Avoid nephrotoxic drugs  - Renal eval     3. Elevated Troponin  - Tele monitoring  - Echo   - F/U CE x 3.   - ACS protocol  - Cardio F/U     4. Gout  - Continue Allopurinol   - Not in acute exacerbation

## 2021-01-13 NOTE — CONSULT NOTE ADULT - SUBJECTIVE AND OBJECTIVE BOX
CHIEF COMPLAINT:Patient is a 89y old  Male who presents with a chief complaint of respiratory distress.      HPI:  89 year old [place] male with medical history significant for HTN, HLD, CKD, and Gout and no significant surgical history presents to ED c/o worsening SOB since this morning. Pt was recently discharged from [facility] on 1/5/21 where he tested positive for covid-19. Pt was d/c on Dexamethasone and Eliquis. He states that he was having increasing difficulty breathing, even on minimal ambulation. Also started having bleed from nose and gums. States that it started today, and is mild amount of blood loss. Denies fevers, nausea, emesis, chest pain, abdominal pain, dysuria, hematuria, diarrhea, constipation, or any other acute complaints. NKDA .     ED: ICU was consulted as pt was hypoxic and on NRB 15 L, initially. Patient was given dose of decadron. His respiratory status improved, saturating 99% on NRB. So patient was downgraded to medicine. (12 Jan 2021 20:23)      PAST MEDICAL & SURGICAL HISTORY:  Gout    CKD (chronic kidney disease)    HLD (hyperlipidemia)    HTN (hypertension)            MEDICATIONS  (STANDING):  ALBUTerol    90 MICROgram(s) HFA Inhaler 1 Puff(s) Inhalation every 4 hours  allopurinol 100 milliGRAM(s) Oral at bedtime  amLODIPine   Tablet 10 milliGRAM(s) Oral daily  ascorbic acid 500 milliGRAM(s) Oral daily  cholecalciferol 2000 Unit(s) Oral daily  dexAMETHasone  Injectable 6 milliGRAM(s) IV Push daily  hydrALAZINE 50 milliGRAM(s) Oral three times a day  metoprolol tartrate 50 milliGRAM(s) Oral three times a day  pantoprazole    Tablet 40 milliGRAM(s) Oral before breakfast  sevelamer carbonate 800 milliGRAM(s) Oral two times a day  sodium bicarbonate 650 milliGRAM(s) Oral daily  torsemide 30 milliGRAM(s) Oral <User Schedule>  zinc sulfate 220 milliGRAM(s) Oral daily    MEDICATIONS  (PRN):  acetaminophen   Tablet .. 650 milliGRAM(s) Oral every 6 hours PRN Temp greater or equal to 38C (100.4F)  guaiFENesin  milliGRAM(s) Oral every 12 hours PRN Cough      FAMILY HISTORY:No hx of CAD      SOCIAL HISTORY:    [x ] Non-smoker    [x ] Alcohol-denies    Allergies    No Known Allergies    Intolerances    	    REVIEW OF SYSTEMS:  CONSTITUTIONAL: No fever, weight loss, or fatigue  EYES: No eye pain, visual disturbances, or discharge  ENT:  No difficulty hearing, tinnitus, vertigo; No sinus or throat pain  NECK: No pain or stiffness  RESPIRATORY: No cough, wheezing, chills or hemoptysis; + Shortness of Breath  CARDIOVASCULAR: No chest pain, palpitations, passing out, dizziness, or leg swelling  GASTROINTESTINAL: No abdominal or epigastric pain. No nausea, vomiting, or hematemesis; No diarrhea or constipation. No melena or hematochezia.  GENITOURINARY: No dysuria, frequency, hematuria, or incontinence  NEUROLOGICAL: No headaches, memory loss, loss of strength, numbness, or tremors  SKIN: No itching, burning, rashes, or lesions   LYMPH Nodes: No enlarged glands  ENDOCRINE: No heat or cold intolerance; No hair loss  MUSCULOSKELETAL: No joint pain or swelling; No muscle, back, or extremity pain  PSYCHIATRIC: No depression, anxiety, mood swings, or difficulty sleeping  HEME/LYMPH: No easy bruising, or bleeding gums  ALLERGY AND IMMUNOLOGIC: No hives or eczema	      PHYSICAL EXAM:  T(C): 36.6 (01-13-21 @ 08:01), Max: 37.3 (01-12-21 @ 14:32)  HR: 92 (01-13-21 @ 08:01) (82 - 92)  BP: 133/45 (01-13-21 @ 08:01) (133/45 - 175/113)  RR: 20 (01-13-21 @ 08:01) (17 - 20)  SpO2: 100% (01-13-21 @ 08:01) (97% - 100%)        Appearance: Normal	  HEENT:   Normal oral mucosa, PERRL, EOMI	  Lymphatic: No lymphadenopathy  Cardiovascular: Normal S1 S2, No JVD, No murmurs, No edema  Respiratory: B/L ronchi  Psychiatry: A & O x 3, Mood & affect appropriate  Gastrointestinal:  Soft, Non-tender, + BS	  Skin: No rashes, No ecchymoses, No cyanosis	  Neurologic: Non-focal  Extremities: Normal range of motion, No clubbing, cyanosis or edema  Vascular: Peripheral pulses palpable 2+ bilaterally    ECG:  	NSR with nl axis    LABS:	 	    CARDIAC MARKERS:  CARDIAC MARKERS ( 13 Jan 2021 04:48 )  0.059 ng/mL / x     / 25 U/L / x     / 1.8 ng/mL  CARDIAC MARKERS ( 12 Jan 2021 16:37 )  0.069 ng/mL / x     / x     / x     / x                                  9.0    8.03  )-----------( 109      ( 13 Jan 2021 07:25 )             28.8     01-13    140  |  107  |  174<H>  ----------------------------<  234<H>  3.9   |  17<L>  |  5.06<H>    Ca    8.2<L>      13 Jan 2021 07:25  Phos  7.2     01-13  Mg     1.9     01-13    TPro  6.0  /  Alb  2.4<L>  /  TBili  0.7  /  DBili  x   /  AST  17  /  ALT  32  /  AlkPhos  69  01-13    proBNP: Serum Pro-Brain Natriuretic Peptide: 9057 pg/mL (01-12 @ 16:37)    Lipid Profile: Cholesterol 104  LDL --  HDL 41        TSH: Thyroid Stimulating Hormone, Serum: 0.57 uU/mL (01-13 @ 07:25)            tosha< from: Xray Chest 1 View-PORTABLE IMMEDIATE (01.12.21 @ 15:15) >      PROCEDURE DATE:  01/12/2021          INTERPRETATION:  INDICATION: Sepsis    PRIORS: None    VIEWS: Portable AP radiography of the chest performed.    FINDINGS: Heart size appears within normal limits. No superior mediastinal abnormalities are identified. Bilateral lung parenchymal infiltrates identified with appearance suggestive for atypical viral pneumonia/Covid 19. No pleural effusion or pneumothorax is demonstrated. No mediastinalshift is noted. Degenerative changes of the thoracic spine noted.    IMPRESSION: Bilateral lung parenchymal infiltrates identified with appearance suggestive for atypical viral pneumonia/Covid 19.              MARTINE LUCERO MD; Attending Radiologist  This document has been electronically signed. Jan 12 2021  3:56PM    < end of copied text >

## 2021-01-14 NOTE — PROGRESS NOTE ADULT - ASSESSMENT
1. Hypoxic Respiratory Failure 2nd to PNA/Covid   - Covid detected.   - IGG positive   - CXR noted - b/l infiltrates   - Vit C, Vit D, Zinc   - Continue Dexamethasone    - Continue Singulair and Pepcid   - Robitussin PRN for cough  - Tylenol PRN for temp   - O2 Supp - on NRM - taper as feasible   - Monitor O2 Sat.  - Monitor labs  - F.U CXR  - Unable to obtain VQ or CT given CKD   - Check labs: ferritin, ddimer, ESR, LDH, Procalcitonin, CRP and lactate  - Prone positioning as tolerated   - Pulm F/U   - Isolation precautions   - DVT and GI PPX     2. CKD   - Creat 5.15  - Avoid nephrotoxic drugs  - Renal F/U     3. Elevated Troponin  - Tele monitoring  - Echo   - F/U CE x 3.   - ACS protocol  - Cardio F/U     4. Gout  - Continue Allopurinol   - Not in acute exacerbation  1. Hypoxic Respiratory Failure 2nd to PNA/Covid   - Covid detected.   - IGG positive   - CXR noted - b/l infiltrates   - S/P RRT this AM for Respiratory distress.   - ICU consulted - no escalation of care at present.   - Vit C, Vit D, Zinc   - Continue Dexamethasone    - Continue Singulair and Pepcid   - Robitussin PRN for cough  - Tylenol PRN for temp   - O2 Supp - on NRM - taper as feasible   - Monitor O2 Sat.  - Monitor labs  - F.U CXR  - Unable to obtain VQ or CT given CKD   - Check labs: ferritin, ddimer, ESR, LDH, Procalcitonin, CRP and lactate  - Prone positioning as tolerated   - Pulm F/U   - Isolation precautions   - DVT and GI PPX     2. CKD   - Creat 5.15  - Avoid nephrotoxic drugs  - Renal F/U     3. Elevated Troponin  - Tele monitoring  - Echo   - F/U CE x 3.   - ACS protocol  - Cardio F/U     4. Gout  - Continue Allopurinol   - Not in acute exacerbation

## 2021-01-14 NOTE — CONSULT NOTE ADULT - ASSESSMENT
Patient is a 89y old  Male with medical history significant for HTN, HLD, CKD, and Gout, now presents to the ER for evaluation of worsening SOB . Pt was recently discharged from [facility] on 1/5/21 where he tested positive for COVID-19. He was d/c on Dexamethasone and Eliquis. He states that he was having increasing difficulty breathing, even on minimal ambulation. Also started having bleed from nose and gums. He has no fever, chest pain or any other acute complaints. On admission, he has no fever but hypoxic, hence ICU was consulted since patient is on NRB 15 L, initially. Patient was given dose of decadron, and his respiratory status improved, saturating 99% on NRB. So patient was downgraded to medicine.  Now the ID consult requested to assist with further evaluation and  management.     # COVID pneumonia with hypoxia    would recommend:    1. Continue Supplemental oxygenation and bronchodilator as needed  2. Aspiration precaution  3. Continue Dexamethasone and hold Remdesivir due to kidney insufficiency  4. COVID precaution  5. Continue supportive care    will follow the patient with you and make further recommendation based on the clinical course and Lab results  Thank you for the opportunity to participate in Mr. LOUIS's care    Attending Attestation:    Spent more than 65 minutes on total encounter, more than 50 % of the visit was spent counseling and/or coordinating care by the Attending physician.       Patient is a 89y old  Male with medical history significant for HTN, HLD, CKD, and Gout, now presents to the ER for evaluation of worsening SOB . Pt was recently discharged from [facility] on 1/5/21 where he tested positive for COVID-19. He was d/c on Dexamethasone and Eliquis. He states that he was having increasing difficulty breathing, even on minimal ambulation. Also started having bleed from nose and gums. He has no fever, chest pain or any other acute complaints. On admission, he has no fever but hypoxic, hence ICU was consulted since patient is on NRB 15 L, initially. Patient was given dose of decadron, and his respiratory status improved, saturating 99% on NRB. So patient was downgraded to medicine.  Now the ID consult requested to assist with further evaluation and  management.     # COVID pneumonia with hypoxia    would recommend:    1. Continue Supplemental oxygenation and bronchodilator as needed  2. Aspiration precaution  3. Continue Dexamethasone and hold Remdesivir due to kidney insufficiency  4. COVID precaution  5. Continue supportive care    d/w Nursing staff     will follow the patient with you and make further recommendation based on the clinical course and Lab results  Thank you for the opportunity to participate in Mr. LOUIS's care    Attending Attestation:    Spent more than 65 minutes on total encounter, more than 50 % of the visit was spent counseling and/or coordinating care by the Attending physician.

## 2021-01-14 NOTE — DISCHARGE NOTE PROVIDER - NSDCCPCAREPLAN_GEN_ALL_CORE_FT
PRINCIPAL DISCHARGE DIAGNOSIS  Diagnosis: Coronavirus infection  Assessment and Plan of Treatment: You came with covid pneumonia. You are been getting decadron. You are on non rebreather 15 L      SECONDARY DISCHARGE DIAGNOSES  Diagnosis: CKD (chronic kidney disease)  Assessment and Plan of Treatment: You have CKD stage V . Cr is 5    Diagnosis: Pneumonia  Assessment and Plan of Treatment:

## 2021-01-14 NOTE — CONSULT NOTE ADULT - SUBJECTIVE AND OBJECTIVE BOX
Patient is a 89y old  Male with medical history significant for HTN, HLD, CKD, and Gout, now presents to the ER for evaluation of worsening SOB . Pt was recently discharged from [facility] on 21 where he tested positive for COVID-19. He was d/c on Dexamethasone and Eliquis. He states that he was having increasing difficulty breathing, even on minimal ambulation. Also started having bleed from nose and gums. He has no fever, chest pain or any other acute complaints. On admission, he has no fever but hypoxic, hence ICU was consulted since patient is on NRB 15 L, initially. Patient was given dose of decadron, and his respiratory status improved, saturating 99% on NRB. So patient was downgraded to medicine.  Now the ID consult requested to assist with further evaluation and  management.       REVIEW OF SYSTEMS: Total of twelve systems have been reviewed and found to be negative unless mentioned in HPI      PAST MEDICAL & SURGICAL HISTORY:  Gout  CKD (chronic kidney disease)  HLD (hyperlipidemia)  HTN (hypertension)      SOCIAL HISTORY  Alcohol: Does not drink  Tobacco: Does not smoke  Illicit substance use: None      FAMILY HISTORY: Non contributory to the present illness      ALLERGIES: No Known Allergies        Vital Signs Last 24 Hrs  T(C): 36.4 (2021 15:55), Max: 37.8 (2021 04:58)  T(F): 97.6 (2021 15:55), Max: 100.1 (2021 04:58)  HR: 83 (2021 15:55) (83 - 103)  BP: 115/44 (2021 15:55) (115/44 - 154/56)  BP(mean): --  RR: 19 (2021 15:55) (19 - 21)  SpO2: 96% (2021 15:55) (92% - 99%)        PHYSICAL EXAM:  GENERAL: Not in distress   CHEST/LUNG: Not using accessory muscles   HEART: s1 and s2 present  ABDOMEN:  Nontender and  Nondistended  EXTREMITIES: No pedal  edema  CNS: Awake and Alert      LABS:                        9.2    8.89  )-----------( 118      ( 2021 07:51 )             29.9       01-14    144  |  109<H>  |  181<H>  ----------------------------<  146<H>  4.0   |  16<L>  |  5.16<H>    Ca    8.6      2021 07:51  Phos  5.8       Mg     2.0         TPro  6.0  /  Alb  2.4<L>  /  TBili  0.7  /  DBili  x   /  AST  17  /  ALT  32  /  AlkPhos  69  -    PT/INR - ( 2021 07:25 )   PT: 19.9 sec;   INR: 1.72 ratio           CAPILLARY BLOOD GLUCOSE  POCT Blood Glucose.: 144 mg/dL (2021 05:37)        Urinalysis Basic - ( 2021 00:14 )  Color: Yellow / Appearance: Clear / S.015 / pH: x  Gluc: x / Ketone: Negative  / Bili: Negative / Urobili: Negative   Blood: x / Protein: 100 / Nitrite: Negative   Leuk Esterase: Trace / RBC: 10-25 /HPF / WBC 3-5 /HPF   Sq Epi: x / Non Sq Epi: Few /HPF / Bacteria: Few /HPF        MEDICATIONS  (STANDING):  ALBUTerol    90 MICROgram(s) HFA Inhaler 1 Puff(s) Inhalation every 4 hours  allopurinol 100 milliGRAM(s) Oral at bedtime  amLODIPine   Tablet 10 milliGRAM(s) Oral daily  ascorbic acid 500 milliGRAM(s) Oral daily  cholecalciferol 2000 Unit(s) Oral daily  dexAMETHasone  Injectable 6 milliGRAM(s) IV Push daily  dextrose 5% + sodium chloride 0.45% 1000 milliLiter(s) (60 mL/Hr) IV Continuous <Continuous>  heparin   Injectable 5000 Unit(s) SubCutaneous every 8 hours  hydrALAZINE 50 milliGRAM(s) Oral three times a day  latanoprost 0.005% Ophthalmic Solution 1 Drop(s) Both EYES at bedtime  metoprolol tartrate 50 milliGRAM(s) Oral three times a day  montelukast 10 milliGRAM(s) Oral daily  pantoprazole    Tablet 40 milliGRAM(s) Oral before breakfast  sevelamer carbonate 800 milliGRAM(s) Oral three times a day with meals  sodium bicarbonate 650 milliGRAM(s) Oral three times a day  zinc sulfate 220 milliGRAM(s) Oral daily    MEDICATIONS  (PRN):  acetaminophen   Tablet .. 650 milliGRAM(s) Oral every 6 hours PRN Temp greater or equal to 38C (100.4F)  guaiFENesin  milliGRAM(s) Oral every 12 hours PRN Cough          RADIOLOGY & ADDITIONAL TESTS:             Patient is a 89y old  Male with medical history significant for HTN, HLD, CKD, and Gout, now presents to the ER for evaluation of worsening SOB . Pt was recently discharged from [facility] on 21 where he tested positive for COVID-19. He was d/c on Dexamethasone and Eliquis. He states that he was having increasing difficulty breathing, even on minimal ambulation. Also started having bleed from nose and gums. He has no fever, chest pain or any other acute complaints. On admission, he has no fever but hypoxic, hence ICU was consulted since patient is on NRB 15 L, initially. Patient was given dose of decadron, and his respiratory status improved, saturating 99% on NRB. So patient was downgraded to medicine.  Now the ID consult requested to assist with further evaluation and  management.       REVIEW OF SYSTEMS: Total of twelve systems have been reviewed and found to be negative unless mentioned in HPI      PAST MEDICAL & SURGICAL HISTORY:  Gout  CKD (chronic kidney disease)  HLD (hyperlipidemia)  HTN (hypertension)      SOCIAL HISTORY  Alcohol: Does not drink  Tobacco: Does not smoke  Illicit substance use: None      FAMILY HISTORY: Non contributory to the present illness      ALLERGIES: No Known Allergies        Vital Signs Last 24 Hrs  T(C): 36.4 (2021 15:55), Max: 37.8 (2021 04:58)  T(F): 97.6 (2021 15:55), Max: 100.1 (2021 04:58)  HR: 83 (2021 15:55) (83 - 103)  BP: 115/44 (2021 15:55) (115/44 - 154/56)  BP(mean): --  RR: 19 (2021 15:55) (19 - 21)  SpO2: 96% (2021 15:55) (92% - 99%)        PHYSICAL EXAM:  GENERAL: Not in distress, on 15 Liter oxygen   CHEST/LUNG: Not using accessory muscles   HEART: s1 and s2 present  ABDOMEN:  Nontender and  Nondistended  EXTREMITIES: No pedal  edema  CNS: Awake and Alert      LABS:                        9.2    8.89  )-----------( 118      ( 2021 07:51 )             29.9       01-14    144  |  109<H>  |  181<H>  ----------------------------<  146<H>  4.0   |  16<L>  |  5.16<H>    Ca    8.6      2021 07:51  Phos  5.8       Mg     2.0         TPro  6.0  /  Alb  2.4<L>  /  TBili  0.7  /  DBili  x   /  AST  17  /  ALT  32  /  AlkPhos  69      PT/INR - ( 2021 07:25 )   PT: 19.9 sec;   INR: 1.72 ratio           CAPILLARY BLOOD GLUCOSE  POCT Blood Glucose.: 144 mg/dL (2021 05:37)        Urinalysis Basic - ( 2021 00:14 )  Color: Yellow / Appearance: Clear / S.015 / pH: x  Gluc: x / Ketone: Negative  / Bili: Negative / Urobili: Negative   Blood: x / Protein: 100 / Nitrite: Negative   Leuk Esterase: Trace / RBC: 10-25 /HPF / WBC 3-5 /HPF   Sq Epi: x / Non Sq Epi: Few /HPF / Bacteria: Few /HPF        MEDICATIONS  (STANDING):  ALBUTerol    90 MICROgram(s) HFA Inhaler 1 Puff(s) Inhalation every 4 hours  allopurinol 100 milliGRAM(s) Oral at bedtime  amLODIPine   Tablet 10 milliGRAM(s) Oral daily  ascorbic acid 500 milliGRAM(s) Oral daily  cholecalciferol 2000 Unit(s) Oral daily  dexAMETHasone  Injectable 6 milliGRAM(s) IV Push daily  dextrose 5% + sodium chloride 0.45% 1000 milliLiter(s) (60 mL/Hr) IV Continuous <Continuous>  heparin   Injectable 5000 Unit(s) SubCutaneous every 8 hours  hydrALAZINE 50 milliGRAM(s) Oral three times a day  latanoprost 0.005% Ophthalmic Solution 1 Drop(s) Both EYES at bedtime  metoprolol tartrate 50 milliGRAM(s) Oral three times a day  montelukast 10 milliGRAM(s) Oral daily  pantoprazole    Tablet 40 milliGRAM(s) Oral before breakfast  sevelamer carbonate 800 milliGRAM(s) Oral three times a day with meals  sodium bicarbonate 650 milliGRAM(s) Oral three times a day  zinc sulfate 220 milliGRAM(s) Oral daily    MEDICATIONS  (PRN):  acetaminophen   Tablet .. 650 milliGRAM(s) Oral every 6 hours PRN Temp greater or equal to 38C (100.4F)  guaiFENesin  milliGRAM(s) Oral every 12 hours PRN Cough        RADIOLOGY & ADDITIONAL TESTS:    21 : Xray Chest 1 View-PORTABLE IMMEDIATE (21 @ 15:15) : Bilateral lung parenchymal infiltrates identified with appearance suggestive for atypical viral pneumonia/Covid 19.

## 2021-01-14 NOTE — PROGRESS NOTE ADULT - PROBLEM SELECTOR PLAN 1
Patient presented respiratory distress  COVID positive  OE has diffuse crackles and ronchi  pedal edema 1+  Desaturating on RA, also has moderate respiratory distress with increased work of breathing  Saturation improved to 99 on NRB  Pro BNP elevated  Started on decadron  started on albuterol  O2 supplementation as needed  Monitor respiratory status  f/u markers  f/u d-dimer Patient presented respiratory distress  COVID positive  Desaturating on RA, also has moderate respiratory distress with increased work of breathing  Started on decadron  started on albuterol, Montelukast   Monitor respiratory status  COVID markers elevated   NRB on 15 L  Prone if desat

## 2021-01-14 NOTE — PROGRESS NOTE ADULT - SUBJECTIVE AND OBJECTIVE BOX
Patient is a 89y old  Male who presents with a chief complaint of respiratory distress (2021 12:09)  Patient is lethargic, arousable, laying in bed with NRBM in NAD. Still with sob/Colbert    INTERVAL HPI/OVERNIGHT EVENTS:      VITAL SIGNS:  T(F): 97.1 (21 @ 11:16)  HR: 89 (21 @ 11:16)  BP: 131/44 (21 @ 11:16)  RR: 19 (21 @ 11:16)  SpO2: 99% (21 @ 11:16)  Wt(kg): --  I&O's Detail    2021 07:01  -  2021 07:00  --------------------------------------------------------  IN:    Oral Fluid: 275 mL  Total IN: 275 mL    OUT:    Voided (mL): 400 mL  Total OUT: 400 mL    Total NET: -125 mL              REVIEW OF SYSTEMS:    CONSTITUTIONAL:  No fevers, chills, sweats    HEENT:  Eyes:  No diplopia or blurred vision. ENT:  No earache, sore throat or runny nose.    CARDIOVASCULAR:  No pressure, squeezing, tightness, or heaviness about the chest; no palpitations.    RESPIRATORY:  Per HPI    GASTROINTESTINAL:  No abdominal pain, nausea, vomiting or diarrhea.    GENITOURINARY:  No dysuria, frequency or urgency.    NEUROLOGIC:  No paresthesias, fasciculations, seizures or weakness.    PSYCHIATRIC:  No disorder of thought or mood.      PHYSICAL EXAM:    Constitutional: Well developed and nourished  Eyes:Perrla  ENMT: normal  Neck:supple  Respiratory: good air entry  Cardiovascular: S1 S2 regular  Gastrointestinal: Soft, Non tender  Extremities: No edema  Vascular:normal  Neurological: Lethargic  Musculoskeletal:Normal      MEDICATIONS  (STANDING):  ALBUTerol    90 MICROgram(s) HFA Inhaler 1 Puff(s) Inhalation every 4 hours  allopurinol 100 milliGRAM(s) Oral at bedtime  amLODIPine   Tablet 10 milliGRAM(s) Oral daily  ascorbic acid 500 milliGRAM(s) Oral daily  cholecalciferol 2000 Unit(s) Oral daily  dexAMETHasone  Injectable 6 milliGRAM(s) IV Push daily  dextrose 5% + sodium chloride 0.45% 1000 milliLiter(s) (75 mL/Hr) IV Continuous <Continuous>  hydrALAZINE 50 milliGRAM(s) Oral three times a day  metoprolol tartrate 50 milliGRAM(s) Oral three times a day  montelukast 10 milliGRAM(s) Oral daily  pantoprazole    Tablet 40 milliGRAM(s) Oral before breakfast  sevelamer carbonate 800 milliGRAM(s) Oral two times a day  sodium bicarbonate 650 milliGRAM(s) Oral daily  zinc sulfate 220 milliGRAM(s) Oral daily    MEDICATIONS  (PRN):  acetaminophen   Tablet .. 650 milliGRAM(s) Oral every 6 hours PRN Temp greater or equal to 38C (100.4F)  guaiFENesin  milliGRAM(s) Oral every 12 hours PRN Cough      Allergies    No Known Allergies    Intolerances        LABS:                        9.2    8.89  )-----------( 118      ( 2021 07:51 )             29.9     -    144  |  109<H>  |  181<H>  ----------------------------<  146<H>  4.0   |  16<L>  |  5.16<H>    Ca    8.6      2021 07:51  Phos  5.8       Mg     2.0         TPro  6.0  /  Alb  2.4<L>  /  TBili  0.7  /  DBili  x   /  AST  17  /  ALT  32  /  AlkPhos  69  -13    PT/INR - ( 2021 07:25 )   PT: 19.9 sec;   INR: 1.72 ratio         PTT - ( 2021 16:37 )  PTT:29.2 sec  Urinalysis Basic - ( 2021 00:14 )    Color: Yellow / Appearance: Clear / S.015 / pH: x  Gluc: x / Ketone: Negative  / Bili: Negative / Urobili: Negative   Blood: x / Protein: 100 / Nitrite: Negative   Leuk Esterase: Trace / RBC: 10-25 /HPF / WBC 3-5 /HPF   Sq Epi: x / Non Sq Epi: Few /HPF / Bacteria: Few /HPF      ABG - ( 2021 16:36 )  pH, Arterial: 7.40  pH, Blood: x     /  pCO2: 31    /  pO2: 128   / HCO3: 19    / Base Excess: -4.8  /  SaO2: 99                CARDIAC MARKERS ( 2021 04:48 )  0.059 ng/mL / x     / 25 U/L / x     / 1.8 ng/mL  CARDIAC MARKERS ( 2021 16:37 )  0.069 ng/mL / x     / x     / x     / x          CAPILLARY BLOOD GLUCOSE        pro-bnp [id number]  @ 16:37     d-dimer --   @ 16:37      RADIOLOGY & ADDITIONAL TESTS:    CXR:  < from: Xray Chest 1 View-PORTABLE IMMEDIATE (21 @ 15:15) >  IMPRESSION: Bilateral lung parenchymal infiltrates identified with appearance suggestive for atypical viral pneumonia/Covid 19.    < end of copied text >    Ct scan chest:    ekg;    echo:

## 2021-01-14 NOTE — PROGRESS NOTE ADULT - SUBJECTIVE AND OBJECTIVE BOX
CHIEF COMPLAINT:Patient is a 89y old  Male who presents with a chief complaint of respiratory distress.Pt s/p rapid response and ICU re-eval.    	  REVIEW OF SYSTEMS:  CONSTITUTIONAL: No fever, weight loss, or fatigue  EYES: No eye pain, visual disturbances, or discharge  ENT:  No difficulty hearing, tinnitus, vertigo; No sinus or throat pain  NECK: No pain or stiffness  RESPIRATORY: No cough, wheezing, chills or hemoptysis; +Shortness of Breath  CARDIOVASCULAR: No chest pain, palpitations, passing out, dizziness, or leg swelling  GASTROINTESTINAL: No abdominal or epigastric pain. No nausea, vomiting, or hematemesis; No diarrhea or constipation. No melena or hematochezia.  GENITOURINARY: No dysuria, frequency, hematuria, or incontinence  NEUROLOGICAL: No headaches, memory loss, loss of strength, numbness, or tremors  SKIN: No itching, burning, rashes, or lesions   LYMPH Nodes: No enlarged glands  ENDOCRINE: No heat or cold intolerance; No hair loss  MUSCULOSKELETAL: No joint pain or swelling; No muscle, back, or extremity pain  PSYCHIATRIC: No depression, anxiety, mood swings, or difficulty sleeping  HEME/LYMPH: No easy bruising, or bleeding gums  ALLERGY AND IMMUNOLOGIC: No hives or eczema	      PHYSICAL EXAM:  T(C): 36.3 (01-14-21 @ 07:26), Max: 37.8 (01-14-21 @ 04:58)  HR: 88 (01-14-21 @ 07:26) (84 - 103)  BP: 130/61 (01-14-21 @ 07:26) (130/61 - 154/56)  RR: 19 (01-14-21 @ 07:26) (19 - 21)  SpO2: 96% (01-14-21 @ 07:26) (92% - 99%)  Wt(kg): --  I&O's Summary    13 Jan 2021 07:01  -  14 Jan 2021 07:00  --------------------------------------------------------  IN: 275 mL / OUT: 400 mL / NET: -125 mL        Appearance: Normal	  HEENT:   Normal oral mucosa, PERRL, EOMI	  Lymphatic: No lymphadenopathy  Cardiovascular: Normal S1 S2, No JVD, No murmurs, No edema  Respiratory: B/L [facility]  Psychiatry: A & O x 3, Mood & affect appropriate  Gastrointestinal:  Soft, Non-tender, + BS	  Skin: No rashes, No ecchymoses, No cyanosis	  Neurologic: Non-focal  Extremities: Normal range of motion, No clubbing, cyanosis or edema  Vascular: Peripheral pulses palpable 2+ bilaterally    MEDICATIONS  (STANDING):  ALBUTerol    90 MICROgram(s) HFA Inhaler 1 Puff(s) Inhalation every 4 hours  allopurinol 100 milliGRAM(s) Oral at bedtime  amLODIPine   Tablet 10 milliGRAM(s) Oral daily  ascorbic acid 500 milliGRAM(s) Oral daily  cholecalciferol 2000 Unit(s) Oral daily  dexAMETHasone  Injectable 6 milliGRAM(s) IV Push daily  hydrALAZINE 50 milliGRAM(s) Oral three times a day  metoprolol tartrate 50 milliGRAM(s) Oral three times a day  montelukast 10 milliGRAM(s) Oral daily  pantoprazole    Tablet 40 milliGRAM(s) Oral before breakfast  sevelamer carbonate 800 milliGRAM(s) Oral two times a day  sodium bicarbonate 650 milliGRAM(s) Oral daily  torsemide 30 milliGRAM(s) Oral <User Schedule>  zinc sulfate 220 milliGRAM(s) Oral daily      TELEMETRY: nsr	     	  	  LABS:	 	    CARDIAC MARKERS:  CARDIAC MARKERS ( 13 Jan 2021 04:48 )  0.059 ng/mL / x     / 25 U/L / x     / 1.8 ng/mL  CARDIAC MARKERS ( 12 Jan 2021 16:37 )  0.069 ng/mL / x     / x     / x     / x                                    9.2    8.89  )-----------( 118      ( 14 Jan 2021 07:51 )             29.9     01-14    144  |  109<H>  |  x   ----------------------------<  146<H>  4.0   |  16<L>  |  5.16<H>    Ca    8.6      14 Jan 2021 07:51  Phos  5.8     01-14  Mg     2.0     01-14    TPro  6.0  /  Alb  2.4<L>  /  TBili  0.7  /  DBili  x   /  AST  17  /  ALT  32  /  AlkPhos  69  01-13    proBNP: Serum Pro-Brain Natriuretic Peptide: 9057 pg/mL (01-12 @ 16:37)    Lipid Profile: Cholesterol 104  LDL --  HDL 41      HgA1c:   TSH: Thyroid Stimulating Hormone, Serum: 0.57 uU/mL (01-13 @ 07:25)

## 2021-01-14 NOTE — PROGRESS NOTE ADULT - PROBLEM SELECTOR PLAN 1
isolation precautions  oxygen supp  monitor oxygen sat  Monitor LDH. LFTs, CRP, D-Dimer, Ferritin and procalcitonin  Vit C, D and zinc supp  montelukast 10 mgs po  Steroids IV

## 2021-01-14 NOTE — CONSULT NOTE ADULT - SUBJECTIVE AND OBJECTIVE BOX
Chief complain/HPI  89 year old [place]ian male with medical history significant for HTN, HLD, CKD, and Gout and no significant surgical history presents to ED c/o worsening SOB since this morning. Pt was recently discharged from [facility] on 21 where he tested positive for covid-19. Pt was d/c on Dexamethasone and Eliquis. He states that he was having increasing difficulty breathing, even on minimal ambulation. Also started having bleed from nose and gums. States that it started today, and is mild amount of blood loss. Denies fevers, nausea, emesis, chest pain, abdominal pain, dysuria, hematuria, diarrhea, constipation, or any other acute complaints. NKDA .     ED: ICU was consulted as pt was hypoxic and on NRB 15 L, initially. Patient was given dose of decadron. His respiratory status improved, saturating 99% on NRB. So patient was downgraded to medicine.      Review of Systems:  Other Review of Systems: All other review of systems negative, except as noted in HPI       Allergies and Intolerances:        Allergies:  	No Known Allergies:     Home Medications:   * Patient Currently Takes Medications as of 2021 18:30 documented in Structured Notes  · 	apixaban 2.5 mg oral tablet: Last Dose Taken:  , 1 tab(s) orally 2 times a day  · 	ascorbic acid 1000 mg oral tablet, chewable: Last Dose Taken:  , 1 tab(s) orally once a day  · 	dexamethasone 6 mg oral tablet: Last Dose Taken:  , 1 tab(s) orally once a day  · 	famotidine 10 mg oral tablet: Last Dose Taken:  , 1 tab(s) orally once a day  · 	guaiFENesin 600 mg oral tablet, extended release: 1 tab(s) orally every 12 hours  · 	Metoprolol Tartrate 50 mg oral tablet: 1 tab(s) orally 3 times a day  · 	sevelamer hydrochloride 800 mg oral tablet: 1 tab(s) orally 2 times a day  · 	zinc sulfate 220 mg oral capsule: 1 cap(s) orally once a day  · 	amLODIPine 10 mg oral tablet: Last Dose Taken:  , 1 tab(s) orally once a day  · 	hydrALAZINE 50 mg oral tablet: 1 tab(s) orally 3 times a day  · 	torsemide 10 mg oral tablet: 3 tab(s) orally 3 times a day  · 	allopurinol 100 mg oral tablet: Last Dose Taken:  , 1 tab(s) orally once a day (at bedtime)  · 	Aspir 81 oral delayed release tablet: Last Dose Taken:  , 1 tab(s) orally once a day  · 	bimatoprost 0.01% ophthalmic solution: 1 drop(s) to each affected eye once a day (in the evening)  · 	darbepoetin jose 40 mcg/0.4 mL injectable solution: 0.8 milliliter(s) injectable every Friday  · 	patiromer 8.4 g oral powder for reconstitution: 1 application orally once a day  · 	sodium bicarbonate 650 mg oral tablet: 1 tab(s) orally once a day  · 	iron sulfate: 90 milligram(s) orally once a day    .    PAST MEDICAL & SURGICAL HISTORY:  Gout    CKD (chronic kidney disease)    HLD (hyperlipidemia)    HTN (hypertension)    No significant past surgical history        Home Medications Reviewed    Hospital Medications:   MEDICATIONS  (STANDING):  ALBUTerol    90 MICROgram(s) HFA Inhaler 1 Puff(s) Inhalation every 4 hours  allopurinol 100 milliGRAM(s) Oral at bedtime  amLODIPine   Tablet 10 milliGRAM(s) Oral daily  ascorbic acid 500 milliGRAM(s) Oral daily  cholecalciferol 2000 Unit(s) Oral daily  dexAMETHasone  Injectable 6 milliGRAM(s) IV Push daily  dextrose 5% + sodium chloride 0.45% 1000 milliLiter(s) (75 mL/Hr) IV Continuous <Continuous>  hydrALAZINE 50 milliGRAM(s) Oral three times a day  metoprolol tartrate 50 milliGRAM(s) Oral three times a day  montelukast 10 milliGRAM(s) Oral daily  pantoprazole    Tablet 40 milliGRAM(s) Oral before breakfast  sevelamer carbonate 800 milliGRAM(s) Oral two times a day  sodium bicarbonate 650 milliGRAM(s) Oral daily  zinc sulfate 220 milliGRAM(s) Oral daily    MEDICATIONS  (PRN):  acetaminophen   Tablet .. 650 milliGRAM(s) Oral every 6 hours PRN Temp greater or equal to 38C (100.4F)  guaiFENesin  milliGRAM(s) Oral every 12 hours PRN Cough      Allergies    No Known Allergies    Intolerances                              9.2    8.89  )-----------( 118      ( 2021 07:51 )             29.9     01-14    144  |  109<H>  |  181<H>  ----------------------------<  146<H>  4.0   |  16<L>  |  5.16<H>    Ca    8.6      2021 07:51  Phos  5.8       Mg     2.0         TPro  6.0  /  Alb  2.4<L>  /  TBili  0.7  /  DBili  x   /  AST  17  /  ALT  32  /  AlkPhos  69      PT/INR - ( 2021 07:25 )   PT: 19.9 sec;   INR: 1.72 ratio         PTT - ( 2021 16:37 )  PTT:29.2 sec  Urinalysis Basic - ( 2021 00:14 )    Color: Yellow / Appearance: Clear / S.015 / pH: x  Gluc: x / Ketone: Negative  / Bili: Negative / Urobili: Negative   Blood: x / Protein: 100 / Nitrite: Negative   Leuk Esterase: Trace / RBC: 10-25 /HPF / WBC 3-5 /HPF   Sq Epi: x / Non Sq Epi: Few /HPF / Bacteria: Few /HPF      Calcium, Random Urine: 3.8 mg/dL ( @ 06:10)  Creatinine, Random Urine: 51 mg/dL ( @ 00:14)  Chloride, Random Urine: 41 mmol/L ( @ 00:14)  Osmolality, Random Urine: 392 mos/kg ( @ 00:14)  Sodium, Random Urine: 47 mmol/L ( @ 00:14)    ABG - ( 2021 16:36 )  pH, Arterial: 7.40  pH, Blood: x     /  pCO2: 31    /  pO2: 128   / HCO3: 19    / Base Excess: -4.8  /  SaO2: 99                  RADIOLOGY & ADDITIONAL STUDIES:    SOCIAL HISTORY: Denies ETOh,Smoking,     FAMILY HISTORY:      REVIEW OF SYSTEMS:  CONSTITUTIONAL: No malaise, No fatigue, No fevers or chills, well developed, no diaphoresis  EYES/ENT: No visual changes;  No vertigo or throat pain   NECK: No pain or stiffness  RESPIRATORY: No cough, wheezing, hemoptysis; No shortness of breath  CARDIOVASCULAR: No chest pain or palpitations. No edema  GASTROINTESTINAL: No abdominal or epigastric pain. No nausea, vomiting, or hematemesis; No diarrhea or constipation. No melena or hematochezia.  GENITOURINARY: No dysuria, frequency, foamy urine, urinary urgency, incontinence or hematuria  NEUROLOGICAL: No numbness or weakness, No tremor  SKIN: No itching, burning, rashes, or lesions   VASCULAR: No claudication  Musculoskeletal: no arthralgia, no myalgia  All other review of systems is negative unless indicated above.    VITALS:  Vital Signs Last 24 Hrs  T(C): 36.2 (2021 11:16), Max: 37.8 (2021 04:58)  T(F): 97.1 (2021 11:16), Max: 100.1 (2021 04:58)  HR: 89 (2021 11:16) (84 - 103)  BP: 131/44 (2021 11:16) (130/61 - 154/56)  BP(mean): --  RR: 19 (2021 11:16) (19 - 21)  SpO2: 99% (2021 11:16) (92% - 99%)     @ 07:01  -  -14 @ 07:00  --------------------------------------------------------  IN: 275 mL / OUT: 400 mL / NET: -125 mL          PHYSICAL EXAM:  Constitutional: NAD  HEENT: anicteric sclera, oropharynx clear, MMM  Neck: No JVD  Respiratory: good air entrance B/L, no wheezes, rales or rhonchi  Cardiovascular: S1, S2, RRR, no pericardial rub, no murmur  Gastrointestinal: BS+, soft, no tenderness, no distension, no bruit  Pelvis: bladder non-distended, no CVA tenderness  Extremities: No cyanosis or clubbing. No peripheral edema  Neurological: A/O x 3, no focal deficits  Psychiatric: Normal mood, normal affect  : No CVA tenderness. No fay.   Skin: No rashes  Vascular: all pulses present  Access:                     Chief complain/HPI  89 year old [place]ian male with medical history significant for HTN, HLD, CKD stage 5, dialysis was not offered to him in the past due to his age.  He came to the ER c/o worsening SOB  He was discharged from [facility] on 21 where he tested positive for covid-19. Pt was d/c on Dexamethasone and Eliquis. He states that he was having increasing difficulty breathing, even on minimal ambulation. Also started having bleed from nose and gums  XR Chset showed bilateral infiltrates    ED: ICU was consulted as pt was hypoxic and on NRB 15 L, initially. Patient was given dose of decadron. His respiratory status improved, saturating 99% on NRB. So patient was downgraded to medicine.      Review of Systems:  Other Review of Systems: All other review of systems negative, except as noted in HPI       Allergies and Intolerances:        Allergies:  	No Known Allergies:     Home Medications:   * Patient Currently Takes Medications as of 2021 18:30 documented in Structured Notes  · 	apixaban 2.5 mg oral tablet: Last Dose Taken:  , 1 tab(s) orally 2 times a day  · 	ascorbic acid 1000 mg oral tablet, chewable: Last Dose Taken:  , 1 tab(s) orally once a day  · 	dexamethasone 6 mg oral tablet: Last Dose Taken:  , 1 tab(s) orally once a day  · 	famotidine 10 mg oral tablet: Last Dose Taken:  , 1 tab(s) orally once a day  · 	guaiFENesin 600 mg oral tablet, extended release: 1 tab(s) orally every 12 hours  · 	Metoprolol Tartrate 50 mg oral tablet: 1 tab(s) orally 3 times a day  · 	sevelamer hydrochloride 800 mg oral tablet: 1 tab(s) orally 2 times a day  · 	zinc sulfate 220 mg oral capsule: 1 cap(s) orally once a day  · 	amLODIPine 10 mg oral tablet: Last Dose Taken:  , 1 tab(s) orally once a day  · 	hydrALAZINE 50 mg oral tablet: 1 tab(s) orally 3 times a day  · 	torsemide 10 mg oral tablet: 3 tab(s) orally 3 times a day  · 	allopurinol 100 mg oral tablet: Last Dose Taken:  , 1 tab(s) orally once a day (at bedtime)  · 	Aspir 81 oral delayed release tablet: Last Dose Taken:  , 1 tab(s) orally once a day  · 	bimatoprost 0.01% ophthalmic solution: 1 drop(s) to each affected eye once a day (in the evening)  · 	darbepoetin jose 40 mcg/0.4 mL injectable solution: 0.8 milliliter(s) injectable every Friday  · 	patiromer 8.4 g oral powder for reconstitution: 1 application orally once a day  · 	sodium bicarbonate 650 mg oral tablet: 1 tab(s) orally once a day  · 	iron sulfate: 90 milligram(s) orally once a day    .    PAST MEDICAL & SURGICAL HISTORY:  Gout    CKD (chronic kidney disease)    HLD (hyperlipidemia)    HTN (hypertension)    No significant past surgical history        Home Medications Reviewed    Hospital Medications:   MEDICATIONS  (STANDING):  ALBUTerol    90 MICROgram(s) HFA Inhaler 1 Puff(s) Inhalation every 4 hours  allopurinol 100 milliGRAM(s) Oral at bedtime  amLODIPine   Tablet 10 milliGRAM(s) Oral daily  ascorbic acid 500 milliGRAM(s) Oral daily  cholecalciferol 2000 Unit(s) Oral daily  dexAMETHasone  Injectable 6 milliGRAM(s) IV Push daily  dextrose 5% + sodium chloride 0.45% 1000 milliLiter(s) (75 mL/Hr) IV Continuous <Continuous>  hydrALAZINE 50 milliGRAM(s) Oral three times a day  metoprolol tartrate 50 milliGRAM(s) Oral three times a day  montelukast 10 milliGRAM(s) Oral daily  pantoprazole    Tablet 40 milliGRAM(s) Oral before breakfast  sevelamer carbonate 800 milliGRAM(s) Oral two times a day  sodium bicarbonate 650 milliGRAM(s) Oral daily  zinc sulfate 220 milliGRAM(s) Oral daily    MEDICATIONS  (PRN):  acetaminophen   Tablet .. 650 milliGRAM(s) Oral every 6 hours PRN Temp greater or equal to 38C (100.4F)  guaiFENesin  milliGRAM(s) Oral every 12 hours PRN Cough      Allergies    No Known Allergies    Intolerances                              9.2    8.89  )-----------( 118      ( 2021 07:51 )             29.9         144  |  109<H>  |  181<H>  ----------------------------<  146<H>  4.0   |  16<L>  |  5.16<H>    Ca    8.6      2021 07:51  Phos  5.8       Mg     2.0         TPro  6.0  /  Alb  2.4<L>  /  TBili  0.7  /  DBili  x   /  AST  17  /  ALT  32  /  AlkPhos  69      PT/INR - ( 2021 07:25 )   PT: 19.9 sec;   INR: 1.72 ratio         PTT - ( 2021 16:37 )  PTT:29.2 sec  Urinalysis Basic - ( 2021 00:14 )    Color: Yellow / Appearance: Clear / S.015 / pH: x  Gluc: x / Ketone: Negative  / Bili: Negative / Urobili: Negative   Blood: x / Protein: 100 / Nitrite: Negative   Leuk Esterase: Trace / RBC: 10-25 /HPF / WBC 3-5 /HPF   Sq Epi: x / Non Sq Epi: Few /HPF / Bacteria: Few /HPF      Calcium, Random Urine: 3.8 mg/dL ( @ 06:10)  Creatinine, Random Urine: 51 mg/dL ( @ 00:14)  Chloride, Random Urine: 41 mmol/L ( @ 00:14)  Osmolality, Random Urine: 392 mos/kg ( @ 00:14)  Sodium, Random Urine: 47 mmol/L ( @ 00:14)    ABG - ( 2021 16:36 )  pH, Arterial: 7.40  pH, Blood: x     /  pCO2: 31    /  pO2: 128   / HCO3: 19    / Base Excess: -4.8  /  SaO2: 99                  RADIOLOGY & ADDITIONAL STUDIES:  INTERPRETATION:  INDICATION: Sepsis    PRIORS: None    VIEWS: Portable AP radiography of the chest performed.    FINDINGS: Heart size appears within normal limits. No superior mediastinal abnormalities are identified. Bilateral lung parenchymal infiltrates identified with appearance suggestive for atypical viral pneumonia/Covid 19. No pleural effusion or pneumothorax is demonstrated. No mediastinalshift is noted. Degenerative changes of the thoracic spine noted.    IMPRESSION: Bilateral lung parenchymal infiltrates identified with appearance suggestive for atypical viral pneumonia/Covid 19.              MARTINE LUCERO MD; Attending Radiologist  This document has been electronically signed. 2021  3:56PM    SOCIAL HISTORY: Denies ETOh,Smoking,     FAMILY HISTORY:      REVIEW OF SYSTEMS:  CONSTITUTIONAL: c/o  malaise and fatigue.  c/o SOB.    VITALS:  Vital Signs Last 24 Hrs  T(C): 36.2 (2021 11:16), Max: 37.8 (2021 04:58)  T(F): 97.1 (2021 11:16), Max: 100.1 (2021 04:58)  HR: 89 (2021 11:16) (84 - 103)  BP: 131/44 (2021 11:16) (130/61 - 154/56)  BP(mean): --  RR: 19 (2021 11:16) (19 - 21)  SpO2: 99% (2021 11:16) (92% - 99%)    13 @ 07:01  -  -14 @ 07:00  --------------------------------------------------------  IN: 275 mL / OUT: 400 mL / NET: -125 mL          PHYSICAL EXAM:  Constitutional: NAD  HEENT: anicteric sclera, oropharynx clear, MMM  Neck: No JVD  Respiratory:  air entrance B/L, no wheezes, rales or rhonchi, bronchial breathing at the base  Cardiovascular: S1, S2, RRR, no pericardial rub, no murmur  Gastrointestinal: BS+, soft, no tenderness, no distension, no bruit  Pelvis: bladder non-distended, no CVA tenderness  Extremities: No cyanosis or clubbing. No peripheral edema  Neurological: A/O x 3, no focal deficits, no tremor.  Skin dry. No uremic dust.

## 2021-01-14 NOTE — DISCHARGE NOTE PROVIDER - NSDCMRMEDTOKEN_GEN_ALL_CORE_FT
allopurinol 100 mg oral tablet: 1 tab(s) orally once a day (at bedtime)  amLODIPine 10 mg oral tablet: 1 tab(s) orally once a day  apixaban 2.5 mg oral tablet: 1 tab(s) orally 2 times a day  ascorbic acid 1000 mg oral tablet, chewable: 1 tab(s) orally once a day  Aspir 81 oral delayed release tablet: 1 tab(s) orally once a day  bimatoprost 0.01% ophthalmic solution: 1 drop(s) to each affected eye once a day (in the evening)  darbepoetin jose 40 mcg/0.4 mL injectable solution: 0.8 milliliter(s) injectable every Friday  dexamethasone 6 mg oral tablet: 1 tab(s) orally once a day  famotidine 10 mg oral tablet: 1 tab(s) orally once a day  guaiFENesin 600 mg oral tablet, extended release: 1 tab(s) orally every 12 hours  hydrALAZINE 50 mg oral tablet: 1 tab(s) orally 3 times a day  iron sulfate: 90 milligram(s) orally once a day  Metoprolol Tartrate 50 mg oral tablet: 1 tab(s) orally 3 times a day  patiromer 8.4 g oral powder for reconstitution: 1 application orally once a day  sevelamer hydrochloride 800 mg oral tablet: 1 tab(s) orally 2 times a day  sodium bicarbonate 650 mg oral tablet: 1 tab(s) orally once a day  torsemide 10 mg oral tablet: 3 tab(s) orally 3 times a day  zinc sulfate 220 mg oral capsule: 1 cap(s) orally once a day

## 2021-01-14 NOTE — PROGRESS NOTE ADULT - PROBLEM SELECTOR PLAN 4
Has h/o ckd  unknown baseline  f/u urine lytes  Not starting remdesivir  Avoid nephrotoxins Has h/o ckd stage , not a candidate for HD due to his age as per family   unknown baseline  Not starting remdesivir  Avoid nephrotoxins  Pt with KARIN on CKD. Appears dry  -Starts on fluids D5 and 1/2 NS  Nephrology Dr Randall

## 2021-01-14 NOTE — PROGRESS NOTE ADULT - ASSESSMENT
89 year old Chinese male with medical history significant for HTN, HLD, CKD, and Gout and no significant surgical history presents to ED c/o worsening SOB since this morning.    ED:  ICU was consulted as pt was hypoxic and on NRB 15 L . Pt was seen at bedside and was saturating 97 % on NRB.   ABG was noted, Likely 2/2 COVID  CXR : Bilateral lung parenchymal infiltrates identified with appearance suggestive for atypical viral pneumonia/Covid 19.  EKG : NSR      Patient is being admitted to [facility] for acute hypoxic respiratory failure 2/2 covid and elevated troponin likely 2/2 demand ischemia.  Problem: Patient Care Overview  Goal: Plan of Care Review  Outcome: Ongoing (interventions implemented as appropriate)  Pt remains on 2L nasal cannula this AM. Gtts: lasix. -200cc/hr. TF remain at goal 50cc/hr, pt tolerating well. Free water bolus q6hr. Pt up to chair this AM. Pt AAOx4, follows commands and moves all extremities purposefully. Pt and spouse updated with plan of care.

## 2021-01-14 NOTE — RAPID RESPONSE TEAM SUMMARY - NSSITUATIONBACKGROUNDRRT_GEN_ALL_CORE
Pt had RRT in the morning for increased work of breathing. Checked his vitals at the bedside. He was saturating 95% on NRB, tachypneic with RR of 24. Spoke to the son and he is Full code. He was revaluated again and his RR improved to 18. Monitor his respiratory status for now on the floor. No need of ICU at this time.

## 2021-01-14 NOTE — PROGRESS NOTE ADULT - PROBLEM SELECTOR PLAN 7
Holding Ac due to nose bleed  on scd for dvt prophylaxis  on ppi for gi prophyalxis Heparin 5000 q8h   on ppi for gi prophyalxis

## 2021-01-14 NOTE — PROGRESS NOTE ADULT - SUBJECTIVE AND OBJECTIVE BOX
PGY-1 Progress Note discussed with attending    PAGER #: [[phone]] TILL 5:00 PM  PLEASE CONTACT ON CALL TEAM:  - On Call Team (Please refer to Marcus) FROM 5:00 PM - 8:30PM  - Nightfloat Team FROM 8:30 -7:30 AM    CHIEF COMPLAINT & BRIEF HOSPITAL COURSE:    INTERVAL HPI/OVERNIGHT EVENTS:       REVIEW OF SYSTEMS:  CONSTITUTIONAL: No fever, weight loss, or fatigue  RESPIRATORY: No cough, wheezing, chills or hemoptysis; No shortness of breath  CARDIOVASCULAR: No chest pain, palpitations, dizziness, or leg swelling  GASTROINTESTINAL: No abdominal pain. No nausea, vomiting, or hematemesis; No diarrhea or constipation. No melena or hematochezia.  GENITOURINARY: No dysuria or hematuria, urinary frequency  NEUROLOGICAL: No headaches, memory loss, loss of strength, numbness, or tremors  SKIN: No itching, burning, rashes, or lesions     Vital Signs Last 24 Hrs  T(C): 36.3 (14 Jan 2021 07:26), Max: 37.8 (14 Jan 2021 04:58)  T(F): 97.4 (14 Jan 2021 07:26), Max: 100.1 (14 Jan 2021 04:58)  HR: 88 (14 Jan 2021 07:26) (84 - 103)  BP: 130/61 (14 Jan 2021 07:26) (130/61 - 154/56)  BP(mean): --  RR: 19 (14 Jan 2021 07:26) (19 - 21)  SpO2: 96% (14 Jan 2021 07:26) (92% - 99%)    PHYSICAL EXAMINATION:  GENERAL: NAD, well built  HEAD:  Atraumatic, Normocephalic  EYES:  conjunctiva and sclera clear  NECK: Supple, No JVD, Normal thyroid  CHEST/LUNG: Clear to auscultation. Clear to percussion bilaterally; No rales, rhonchi, wheezing, or rubs  HEART: Regular rate and rhythm; No murmurs, rubs, or gallops  ABDOMEN: Soft, Nontender, Nondistended; Bowel sounds present  NERVOUS SYSTEM:  Alert & Oriented X3,    EXTREMITIES:  2+ Peripheral Pulses, No clubbing, cyanosis, or edema  SKIN: warm dry                          9.2    8.89  )-----------( 118      ( 14 Jan 2021 07:51 )             29.9     01-14    144  |  109<H>  |  181<H>  ----------------------------<  146<H>  4.0   |  16<L>  |  5.16<H>    Ca    8.6      14 Jan 2021 07:51  Phos  5.8     01-14  Mg     2.0     01-14    TPro  6.0  /  Alb  2.4<L>  /  TBili  0.7  /  DBili  x   /  AST  17  /  ALT  32  /  AlkPhos  69  01-13    LIVER FUNCTIONS - ( 13 Jan 2021 07:25 )  Alb: 2.4 g/dL / Pro: 6.0 g/dL / ALK PHOS: 69 U/L / ALT: 32 U/L DA / AST: 17 U/L / GGT: x           CARDIAC MARKERS ( 13 Jan 2021 04:48 )  0.059 ng/mL / x     / 25 U/L / x     / 1.8 ng/mL  CARDIAC MARKERS ( 12 Jan 2021 16:37 )  0.069 ng/mL / x     / x     / x     / x          PT/INR - ( 13 Jan 2021 07:25 )   PT: 19.9 sec;   INR: 1.72 ratio         PTT - ( 12 Jan 2021 16:37 )  PTT:29.2 sec    CAPILLARY BLOOD GLUCOSE      RADIOLOGY & ADDITIONAL TESTS:                   PGY-1 Progress Note discussed with attending    PAGER #: [[phone]] TILL 5:00 PM  PLEASE CONTACT ON CALL TEAM:  - On Call Team (Please refer to Marcus) FROM 5:00 PM - 8:30PM  - Nightfloat Team FROM 8:30 -7:30 AM    CHIEF COMPLAINT & BRIEF HOSPITAL COURSE: Admitted for COVID 19. Pt has CKD stage V as per the family.     INTERVAL HPI/ OVERNIGHT EVENTS: Pt was a rapid response early morning due to low oxygen but ICU deferred that time as the ot breathing and O2 saturation improved on 15 L NRB. PT family called multiple times and asking for giving hydroxychlorquine to the patient. Explained by PGY1 once and PGY2 thrice that there is no evidence now for hydroxychlorquine for COVID patient but his son kept arguing and prolonging the conversation.  Dr Marquez was informed by PGY 3 and requested to speak to the son for further explanation.       REVIEW OF SYSTEMS:  CONSTITUTIONAL: No fever, weight loss, or fatigue  RESPIRATORY: No cough, wheezing, chills or hemoptysis;  shortness of breath +ve   CARDIOVASCULAR: No chest pain, palpitations, dizziness, or leg swelling  GASTROINTESTINAL: No abdominal pain. No nausea, vomiting, or hematemesis; No diarrhea or constipation. No melena or hematochezia.  GENITOURINARY: No dysuria or hematuria, urinary frequency  NEUROLOGICAL: No headaches, memory loss, loss of strength, numbness, or tremors  SKIN: No itching, burning, rashes, or lesions     Vital Signs Last 24 Hrs  T(C): 36.3 (14 Jan 2021 07:26), Max: 37.8 (14 Jan 2021 04:58)  T(F): 97.4 (14 Jan 2021 07:26), Max: 100.1 (14 Jan 2021 04:58)  HR: 88 (14 Jan 2021 07:26) (84 - 103)  BP: 130/61 (14 Jan 2021 07:26) (130/61 - 154/56)  BP(mean): --  RR: 19 (14 Jan 2021 07:26) (19 - 21)  SpO2: 96% (14 Jan 2021 07:26) (92% - 99%)    PHYSICAL EXAMINATION:  GENERAL: NAD, well built  HEAD:  Atraumatic, Normocephalic  EYES:  conjunctiva and sclera clear  NECK: Supple, No JVD, Normal thyroid  CHEST/LUNG: dec breadth sounds b/l   HEART: Regular rate and rhythm; No murmurs, rubs, or gallops  ABDOMEN: Soft, Nontender, Nondistended; Bowel sounds present  NERVOUS SYSTEM:  Alert & Oriented X3,    EXTREMITIES:  2+ Peripheral Pulses, No clubbing, cyanosis, or edema  SKIN: warm dry'    MEDICATIONS  (STANDING):  ALBUTerol    90 MICROgram(s) HFA Inhaler 1 Puff(s) Inhalation every 4 hours  allopurinol 100 milliGRAM(s) Oral at bedtime  amLODIPine   Tablet 10 milliGRAM(s) Oral daily  ascorbic acid 500 milliGRAM(s) Oral daily  cholecalciferol 2000 Unit(s) Oral daily  dexAMETHasone  Injectable 6 milliGRAM(s) IV Push daily  dextrose 5% + sodium chloride 0.45% 1000 milliLiter(s) (60 mL/Hr) IV Continuous <Continuous>  hydrALAZINE 50 milliGRAM(s) Oral three times a day  metoprolol tartrate 50 milliGRAM(s) Oral three times a day  montelukast 10 milliGRAM(s) Oral daily  pantoprazole    Tablet 40 milliGRAM(s) Oral before breakfast  sevelamer carbonate 800 milliGRAM(s) Oral three times a day with meals  sodium bicarbonate 650 milliGRAM(s) Oral three times a day  zinc sulfate 220 milliGRAM(s) Oral daily    MEDICATIONS  (PRN):  acetaminophen   Tablet .. 650 milliGRAM(s) Oral every 6 hours PRN Temp greater or equal to 38C (100.4F)  guaiFENesin  milliGRAM(s) Oral every 12 hours PRN Cough                            9.2    8.89  )-----------( 118      ( 14 Jan 2021 07:51 )             29.9     01-14    144  |  109<H>  |  181<H>  ----------------------------<  146<H>  4.0   |  16<L>  |  5.16<H>    Ca    8.6      14 Jan 2021 07:51  Phos  5.8     01-14  Mg     2.0     01-14    TPro  6.0  /  Alb  2.4<L>  /  TBili  0.7  /  DBili  x   /  AST  17  /  ALT  32  /  AlkPhos  69  01-13    LIVER FUNCTIONS - ( 13 Jan 2021 07:25 )  Alb: 2.4 g/dL / Pro: 6.0 g/dL / ALK PHOS: 69 U/L / ALT: 32 U/L DA / AST: 17 U/L / GGT: x           CARDIAC MARKERS ( 13 Jan 2021 04:48 )  0.059 ng/mL / x     / 25 U/L / x     / 1.8 ng/mL  CARDIAC MARKERS ( 12 Jan 2021 16:37 )  0.069 ng/mL / x     / x     / x     / x          PT/INR - ( 13 Jan 2021 07:25 )   PT: 19.9 sec;   INR: 1.72 ratio         PTT - ( 12 Jan 2021 16:37 )  PTT:29.2 sec    CAPILLARY BLOOD GLUCOSE      RADIOLOGY & ADDITIONAL TESTS:

## 2021-01-14 NOTE — DISCHARGE NOTE PROVIDER - HOSPITAL COURSE
89 year old Portuguese male with medical history significant for HTN, HLD, CKD, and Gout and no significant surgical history presents to ED c/o worsening SOB since this morning. Pt was recently discharged from [facility] on 1/5/21 where he tested positive for covid-19. Pt was d/c on Dexamethasone and Eliquis. He states that he was having increasing difficulty breathing, even on minimal ambulation. Also started having bleed from nose and gums. States that it started today, and is mild amount of blood loss. Denies fevers, nausea, emesis, chest pain, abdominal pain, dysuria, hematuria, diarrhea, constipation, or any other acute complaints. NKDA .     ED: ICU was consulted as pt was hypoxic and on NRB 15 L, initially. Patient was given dose of decadron. His respiratory status improved, saturating 99% on NRB. So patient was downgraded to medicine.      Pt is currently on 15L NRB Saturating 99%.

## 2021-01-14 NOTE — CONSULT NOTE ADULT - ASSESSMENT
CKD stage 5 .  Increased Urea/ creatinine ratio due to steroids and possible dehydration.  Non anion and anion gap MA.  Dehydration and reduced po intake  COVID 19 bilateral pneumonia on Dexamethasone.    Suggest to start IV fluid D5W WITH 50 MEQ SODIUM BICARB RATE 60 ML/HOUR.  Follow intake and output.  Continue sodium bicarb po 650 TID  Continue Sevelamer with meals  Continue Dexamethasone.  Start Nephrovit.      Anemia follow iron studies and follow the need for MARIA  Dialysis treatment and option discussed with the family.

## 2021-01-14 NOTE — PROGRESS NOTE ADULT - ASSESSMENT
89 year old Syrian male with medical history significant for HTN, HLD, CKD, and Gout with SOB,COVID pneumonia.  1.D/C Tele monitoring.  2.COVID+-dexamathasone,ID eval called.  3.HTN-cont bp medication.  4.CRI-Renal eval,cont diuretics.  5.Lipid d/o-statin.  6.GI and DVT prophylaxis.

## 2021-01-14 NOTE — PROGRESS NOTE ADULT - SUBJECTIVE AND OBJECTIVE BOX
pt seen in icu [  ], reg med floor [ x  ], bed [ x ], chair at bedside [   ]    REVIEW OF SYSTEMS:    CONSTITUTIONAL: No weakness, fevers or chills  EYES/ENT: No visual changes;  No vertigo or throat pain   NECK: No pain or stiffness  RESPIRATORY: No cough, wheezing, hemoptysis; No shortness of breath  CARDIOVASCULAR: No chest pain or palpitations  GASTROINTESTINAL: No abdominal or epigastric pain. No nausea, vomiting, or hematemesis; No diarrhea or constipation. No melena or hematochezia.  GENITOURINARY: No dysuria, frequency or hematuria  NEUROLOGICAL: No numbness or weakness  SKIN: No itching, burning, rashes, or lesions   All other review of systems is negative unless indicated above.    Physical Exam    General: WN/WD NAD  Neurology: A&Ox3, nonfocal, GONZALEZ x 4  Respiratory: CTA B/L  CV: RRR, S1S2, no murmurs, rubs or gallops  Abdominal: Soft, NT, ND +BS, Last BM  Extremities: No edema, + peripheral pulses      Allergies  No Known Allergies      Health Issues  Pneumonia due to organism    Gout    CKD (chronic kidney disease)    HLD (hyperlipidemia)    HTN (hypertension)    No significant past surgical history        Vitals  T(F): 97.1 (01-14-21 @ 11:16), Max: 100.1 (01-14-21 @ 04:58)  HR: 89 (01-14-21 @ 11:16) (84 - 103)  BP: 131/44 (01-14-21 @ 11:16) (130/61 - 154/56)  RR: 19 (01-14-21 @ 11:16) (19 - 21)  SpO2: 99% (01-14-21 @ 11:16) (92% - 99%)  Wt(kg): --  CAPILLARY BLOOD GLUCOSE          Labs                          9.2    8.89  )-----------( 118      ( 14 Jan 2021 07:51 )             29.9       01-14    144  |  109<H>  |  181<H>  ----------------------------<  146<H>  4.0   |  16<L>  |  5.16<H>    Ca    8.6      14 Jan 2021 07:51  Phos  5.8     01-14  Mg     2.0     01-14    TPro  6.0  /  Alb  2.4<L>  /  TBili  0.7  /  DBili  x   /  AST  17  /  ALT  32  /  AlkPhos  69  01-13      CARDIAC MARKERS ( 13 Jan 2021 04:48 )  0.059 ng/mL / x     / 25 U/L / x     / 1.8 ng/mL  CARDIAC MARKERS ( 12 Jan 2021 16:37 )  0.069 ng/mL / x     / x     / x     / x          Radiology Results    < from: Xray Chest 1 View-PORTABLE IMMEDIATE (01.12.21 @ 15:15) >  IMPRESSION: Bilateral lung parenchymal infiltrates identified with appearance suggestive for atypical viral pneumonia/Covid 19.    < end of copied text >    Meds    MEDICATIONS  (STANDING):  ALBUTerol    90 MICROgram(s) HFA Inhaler 1 Puff(s) Inhalation every 4 hours  allopurinol 100 milliGRAM(s) Oral at bedtime  amLODIPine   Tablet 10 milliGRAM(s) Oral daily  ascorbic acid 500 milliGRAM(s) Oral daily  cholecalciferol 2000 Unit(s) Oral daily  dexAMETHasone  Injectable 6 milliGRAM(s) IV Push daily  dextrose 5% + sodium chloride 0.45% 1000 milliLiter(s) (75 mL/Hr) IV Continuous <Continuous>  hydrALAZINE 50 milliGRAM(s) Oral three times a day  metoprolol tartrate 50 milliGRAM(s) Oral three times a day  montelukast 10 milliGRAM(s) Oral daily  pantoprazole    Tablet 40 milliGRAM(s) Oral before breakfast  sevelamer carbonate 800 milliGRAM(s) Oral two times a day  sodium bicarbonate 650 milliGRAM(s) Oral daily  zinc sulfate 220 milliGRAM(s) Oral daily      MEDICATIONS  (PRN):  acetaminophen   Tablet .. 650 milliGRAM(s) Oral every 6 hours PRN Temp greater or equal to 38C (100.4F)  guaiFENesin  milliGRAM(s) Oral every 12 hours PRN Cough

## 2021-01-15 NOTE — PROGRESS NOTE ADULT - PROBLEM SELECTOR PLAN 5
Has h/o HTn  c/w home meds with parameters  monitor bp -Has h/o HTn  -c/w home meds with parameters  -monitor bp

## 2021-01-15 NOTE — PROGRESS NOTE ADULT - ASSESSMENT
CKD stage 5 , increased BUN due to steroids and dehydration  COVID 19  Hyperpo4  MA improved    Continue support care, continue IV fluids.  No need for emergency dialysis at present

## 2021-01-15 NOTE — PROGRESS NOTE ADULT - ASSESSMENT
89 year old Algerian male with medical history significant for HTN, HLD, CKD, and Gout with SOB,COVID pneumonia.  1.D/C Tele monitoring.  2.COVID+-dexamathasone,ID eval called noted.  3.HTN-cont bp medication.  4.CRI-Renal f/u,IVF.  5.Lipid d/o-statin.  6.GI and DVT prophylaxis.

## 2021-01-15 NOTE — PROGRESS NOTE ADULT - PROBLEM SELECTOR PLAN 1
Patient presented respiratory distress  COVID positive  Desaturating on RA, also has moderate respiratory distress with increased work of breathing  Started on decadron  started on albuterol, Montelukast   Monitor respiratory status  COVID markers elevated   NRB on 15 L  Prone if desat -Patient presented respiratory distress  -COVID positive  -Desaturating on RA, also has moderate respiratory distress with increased work of breathing  -Started on decadron  -started on albuterol, Montelukast   -Monitor respiratory status  -COVID markers elevated   -NRB on 15 L  -Prone if desat

## 2021-01-15 NOTE — PROGRESS NOTE ADULT - SUBJECTIVE AND OBJECTIVE BOX
Patient is seen and examined at the bed side, is afebrile. He remains on supplemental oxygenation, tolerating steroid okay.      REVIEW OF SYSTEMS: All other review systems are negative      ALLERGIES: No Known Allergies      Vital Signs Last 24 Hrs  T(C): 36.2 (15 Allan 2021 20:11), Max: 37.2 (15 Allan 2021 08:43)  T(F): 97.2 (15 Allan 2021 20:11), Max: 98.9 (15 Allan 2021 08:43)  HR: 85 (15 Allan 2021 20:11) (82 - 103)  BP: 127/65 (15 Allan 2021 20:11) (124/57 - 158/59)  BP(mean): --  RR: 19 (15 Allan 2021 20:11) (19 - 20)  SpO2: 100% (15 Allan 2021 16:27) (93% - 100%)      PHYSICAL EXAM:  GENERAL: Not in distress, on 15 Liter oxygen   CHEST/LUNG: Not using accessory muscles   HEART: s1 and s2 present  ABDOMEN:  Nontender and  Nondistended  EXTREMITIES: No pedal  edema  CNS: Awake and Alert      LABS:                                   8.8    5.88  )-----------( 93       ( 15 Allan 2021 07:02 )             28.7                9.2    8.89  )-----------( 118      ( 2021 07:51 )             29.9       01-15    142  |  107  |  186  ----------------------------<  154<H>  4.1   |  19<L>  |  5.26<H>    Ca    8.3<L>      15 Allan 2021 07:02  Phos  6.3     -15  Mg     1.9     15    TPro  5.9<L>  /  Alb  2.3<L>  /  TBili  0.7  /  DBili  x   /  AST  17  /  ALT  22  /  AlkPhos  75  -15    -14    144  |  109<H>  |  181<H>  ----------------------------<  146<H>  4.0   |  16<L>  |  5.16<H>    Ca    8.6      2021 07:51  Phos  5.8       Mg     2.0         TPro  6.0  /  Alb  2.4<L>  /  TBili  0.7  /  DBili  x   /  AST  17  /  ALT  32  /  AlkPhos  69      PT/INR - ( 2021 07:25 )   PT: 19.9 sec;   INR: 1.72 ratio           CAPILLARY BLOOD GLUCOSE  POCT Blood Glucose.: 144 mg/dL (2021 05:37)        Urinalysis Basic - ( 2021 00:14 )  Color: Yellow / Appearance: Clear / S.015 / pH: x  Gluc: x / Ketone: Negative  / Bili: Negative / Urobili: Negative   Blood: x / Protein: 100 / Nitrite: Negative   Leuk Esterase: Trace / RBC: 10-25 /HPF / WBC 3-5 /HPF   Sq Epi: x / Non Sq Epi: Few /HPF / Bacteria: Few /HPF        MEDICATIONS  (STANDING):    ALBUTerol    90 MICROgram(s) HFA Inhaler 1 Puff(s) Inhalation every 4 hours  allopurinol 100 milliGRAM(s) Oral at bedtime  amLODIPine   Tablet 10 milliGRAM(s) Oral daily  ascorbic acid 500 milliGRAM(s) Oral daily  cholecalciferol 2000 Unit(s) Oral daily  dexAMETHasone  Injectable 6 milliGRAM(s) IV Push daily  dextrose 5% + sodium chloride 0.45% 1000 milliLiter(s) (60 mL/Hr) IV Continuous <Continuous>  epoetin jose-epbx (RETACRIT) Injectable 32974 Unit(s) SubCutaneous <User Schedule>  famotidine Injectable 20 milliGRAM(s) IV Push daily  heparin   Injectable 5000 Unit(s) SubCutaneous every 8 hours  hydrALAZINE 50 milliGRAM(s) Oral three times a day  latanoprost 0.005% Ophthalmic Solution 1 Drop(s) Both EYES at bedtime  metoprolol tartrate 50 milliGRAM(s) Oral three times a day  montelukast 10 milliGRAM(s) Oral daily  sevelamer carbonate 800 milliGRAM(s) Oral three times a day with meals  sodium bicarbonate 650 milliGRAM(s) Oral three times a day  zinc sulfate 220 milliGRAM(s) Oral daily        RADIOLOGY & ADDITIONAL TESTS:    21 : Xray Chest 1 View-PORTABLE IMMEDIATE (21 @ 15:15) : Bilateral lung parenchymal infiltrates identified with appearance suggestive for atypical viral pneumonia/Covid 19.      MICROBIOLOGY DATA:    Culture - Urine (21 @ 07:05)   - Amikacin: S <=16   - Aztreonam: S <=4   - Cefepime: S 4   - Ceftazidime: S 4   - Ciprofloxacin: S <=0.25   - Gentamicin: I 8   - Imipenem: I 4   - Levofloxacin: S 1   - Meropenem: S <=1   - Piperacillin/Tazobactam: S <=8   - Tobramycin: S <=2   Specimen Source: .Urine Clean Catch (Midstream)   Culture Results:   10,000 - 49,000 CFU/mL Pseudomonas aeruginosa   Organism Identification: Pseudomonas aeruginosa   Organism: Pseudomonas aeruginosa   Method Type: LINNETTE ood (21 @ 22:10)     Specimen Source: .Blood Blood-Peripheral   Culture Results: No growth to date.     Culture - Blood (21 @ 22:10)   Specimen Source: .Blood Blood-Peripheral   Culture Results: No growth to date.          Patient is seen and examined at the bed side, is afebrile. He remains on NRB, 15 Liter oxygen, and became little agitated.       REVIEW OF SYSTEMS: All other review systems are negative      ALLERGIES: No Known Allergies      Vital Signs Last 24 Hrs  T(C): 36.2 (15 Allan 2021 20:11), Max: 37.2 (15 Allan 2021 08:43)  T(F): 97.2 (15 Allan 2021 20:11), Max: 98.9 (15 Allan 2021 08:43)  HR: 85 (15 Allan 2021 20:11) (82 - 103)  BP: 127/65 (15 Allan 2021 20:11) (124/57 - 158/59)  BP(mean): --  RR: 19 (15 Allan 2021 20:11) (19 - 20)  SpO2: 100% (15 Allan 2021 16:27) (93% - 100%)      PHYSICAL EXAM:  GENERAL: Not in distress, on 15 Liter oxygen   CHEST/LUNG: Not using accessory muscles   HEART: s1 and s2 present  ABDOMEN:  Nontender and  Nondistended  EXTREMITIES: No pedal  edema  CNS: Awake and Alert      LABS:                                   8.8    5.88  )-----------( 93       ( 15 Allan 2021 07:02 )             28.7                9.2    8.89  )-----------( 118      ( 2021 07:51 )             29.9       01-15    142  |  107  |  186  ----------------------------<  154<H>  4.1   |  19<L>  |  5.26<H>    Ca    8.3<L>      15 Allan 2021 07:02  Phos  6.3     -15  Mg     1.9     15    TPro  5.9<L>  /  Alb  2.3<L>  /  TBili  0.7  /  DBili  x   /  AST  17  /  ALT  22  /  AlkPhos  75  -15    -14    144  |  109<H>  |  181<H>  ----------------------------<  146<H>  4.0   |  16<L>  |  5.16<H>    Ca    8.6      2021 07:51  Phos  5.8       Mg     2.0         TPro  6.0  /  Alb  2.4<L>  /  TBili  0.7  /  DBili  x   /  AST  17  /  ALT  32  /  AlkPhos  69      PT/INR - ( 2021 07:25 )   PT: 19.9 sec;   INR: 1.72 ratio           CAPILLARY BLOOD GLUCOSE  POCT Blood Glucose.: 144 mg/dL (2021 05:37)        Urinalysis Basic - ( 2021 00:14 )  Color: Yellow / Appearance: Clear / S.015 / pH: x  Gluc: x / Ketone: Negative  / Bili: Negative / Urobili: Negative   Blood: x / Protein: 100 / Nitrite: Negative   Leuk Esterase: Trace / RBC: 10-25 /HPF / WBC 3-5 /HPF   Sq Epi: x / Non Sq Epi: Few /HPF / Bacteria: Few /HPF        MEDICATIONS  (STANDING):    ALBUTerol    90 MICROgram(s) HFA Inhaler 1 Puff(s) Inhalation every 4 hours  allopurinol 100 milliGRAM(s) Oral at bedtime  amLODIPine   Tablet 10 milliGRAM(s) Oral daily  ascorbic acid 500 milliGRAM(s) Oral daily  cholecalciferol 2000 Unit(s) Oral daily  dexAMETHasone  Injectable 6 milliGRAM(s) IV Push daily  dextrose 5% + sodium chloride 0.45% 1000 milliLiter(s) (60 mL/Hr) IV Continuous <Continuous>  epoetin jose-epbx (RETACRIT) Injectable 32440 Unit(s) SubCutaneous <User Schedule>  famotidine Injectable 20 milliGRAM(s) IV Push daily  heparin   Injectable 5000 Unit(s) SubCutaneous every 8 hours  hydrALAZINE 50 milliGRAM(s) Oral three times a day  latanoprost 0.005% Ophthalmic Solution 1 Drop(s) Both EYES at bedtime  metoprolol tartrate 50 milliGRAM(s) Oral three times a day  montelukast 10 milliGRAM(s) Oral daily  sevelamer carbonate 800 milliGRAM(s) Oral three times a day with meals  sodium bicarbonate 650 milliGRAM(s) Oral three times a day  zinc sulfate 220 milliGRAM(s) Oral daily        RADIOLOGY & ADDITIONAL TESTS:    21 : Xray Chest 1 View-PORTABLE IMMEDIATE (01.12.21 @ 15:15) : Bilateral lung parenchymal infiltrates identified with appearance suggestive for atypical viral pneumonia/Covid 19.      MICROBIOLOGY DATA:    Culture - Urine (21 @ 07:05)   - Amikacin: S <=16   - Aztreonam: S <=4   - Cefepime: S 4   - Ceftazidime: S 4   - Ciprofloxacin: S <=0.25   - Gentamicin: I 8   - Imipenem: I 4   - Levofloxacin: S 1   - Meropenem: S <=1   - Piperacillin/Tazobactam: S <=8   - Tobramycin: S <=2   Specimen Source: .Urine Clean Catch (Midstream)   Culture Results:   10,000 - 49,000 CFU/mL Pseudomonas aeruginosa   Organism Identification: Pseudomonas aeruginosa   Organism: Pseudomonas aeruginosa   Method Type: LINNETTE ood (21 @ 22:10)     Specimen Source: .Blood Blood-Peripheral   Culture Results: No growth to date.     Culture - Blood (21 @ 22:10)   Specimen Source: .Blood Blood-Peripheral   Culture Results: No growth to date.

## 2021-01-15 NOTE — PROGRESS NOTE ADULT - ASSESSMENT
89 year old Pakistani male with medical history significant for HTN, HLD, CKD, and Gout and no significant surgical history presents to ED c/o worsening SOB since this morning.    ED:  ICU was consulted as pt was hypoxic and on NRB 15 L . Pt was seen at bedside and was saturating 97 % on NRB.   ABG was noted, Likely 2/2 COVID  CXR : Bilateral lung parenchymal infiltrates identified with appearance suggestive for atypical viral pneumonia/Covid 19.  EKG : NSR      Patient is being admitted to [facility] for acute hypoxic respiratory failure 2/2 covid and elevated troponin likely 2/2 demand ischemia.

## 2021-01-15 NOTE — PROGRESS NOTE ADULT - PROBLEM SELECTOR PLAN 3
Has elevated troponin on admission  No chest pain at present  Likely demand ischemia  Also has elevated troponin  Pro BNP is also elevated  Likely patient has CHF  f/u echo  Trend trop  Monitor on tele  on statin and bb  f/u echo -Has elevated troponin on admission  -No chest pain at present  -Likely demand ischemia  Also has elevated troponin  -Pro BNP is also elevated  -Likely patient has CHF  -f/u echo  Trend trop  -Monitor on tele  -on statin and bb  -f/u echo

## 2021-01-15 NOTE — PROGRESS NOTE ADULT - SUBJECTIVE AND OBJECTIVE BOX
CHIEF COMPLAINT:Patient is a 89y old  Male who presents with a chief complaint of respiratory distress.Pt on FM.    	  REVIEW OF SYSTEMS:  CONSTITUTIONAL: No fever, weight loss, or fatigue  EYES: No eye pain, visual disturbances, or discharge  ENT:  No difficulty hearing, tinnitus, vertigo; No sinus or throat pain  NECK: No pain or stiffness  RESPIRATORY: No cough, wheezing, chills or hemoptysis; No Shortness of Breath  CARDIOVASCULAR: No chest pain, palpitations, passing out, dizziness, or leg swelling  GASTROINTESTINAL: No abdominal or epigastric pain. No nausea, vomiting, or hematemesis; No diarrhea or constipation. No melena or hematochezia.  GENITOURINARY: No dysuria, frequency, hematuria, or incontinence  NEUROLOGICAL: No headaches, memory loss, loss of strength, numbness, or tremors  SKIN: No itching, burning, rashes, or lesions   LYMPH Nodes: No enlarged glands  ENDOCRINE: No heat or cold intolerance; No hair loss  MUSCULOSKELETAL: No joint pain or swelling; No muscle, back, or extremity pain  PSYCHIATRIC: No depression, anxiety, mood swings, or difficulty sleeping  HEME/LYMPH: No easy bruising, or bleeding gums  ALLERGY AND IMMUNOLOGIC: No hives or eczema	        PHYSICAL EXAM:  T(C): 37.2 (01-15-21 @ 08:43), Max: 37.2 (01-14-21 @ 20:35)  HR: 95 (01-15-21 @ 04:50) (83 - 95)  BP: 131/64 (01-15-21 @ 08:43) (115/44 - 158/59)  RR: 19 (01-15-21 @ 08:43) (19 - 20)  SpO2: 96% (01-15-21 @ 08:43) (93% - 99%)    I&O's Summary    14 Jan 2021 07:01  -  15 Allan 2021 07:00  --------------------------------------------------------  IN: 840 mL / OUT: 0 mL / NET: 840 mL        Appearance: Normal	  HEENT:   Normal oral mucosa, PERRL, EOMI	  Lymphatic: No lymphadenopathy  Cardiovascular: Normal S1 S2, No JVD, No murmurs, No edema  Respiratory: Dec BS atbases  Psychiatry: A & O x 3, Mood & affect appropriate  Gastrointestinal:  Soft, Non-tender, + BS	  Skin: No rashes, No ecchymoses, No cyanosis	  Neurologic: Non-focal  Extremities: Normal range of motion, No clubbing, cyanosis or edema  Vascular: Peripheral pulses palpable 2+ bilaterally    MEDICATIONS  (STANDING):  ALBUTerol    90 MICROgram(s) HFA Inhaler 1 Puff(s) Inhalation every 4 hours  allopurinol 100 milliGRAM(s) Oral at bedtime  amLODIPine   Tablet 10 milliGRAM(s) Oral daily  ascorbic acid 500 milliGRAM(s) Oral daily  cholecalciferol 2000 Unit(s) Oral daily  dexAMETHasone  Injectable 6 milliGRAM(s) IV Push daily  dextrose 5% + sodium chloride 0.45% 1000 milliLiter(s) (60 mL/Hr) IV Continuous <Continuous>  epoetin jose-epbx (RETACRIT) Injectable 76152 Unit(s) SubCutaneous <User Schedule>  famotidine Injectable 20 milliGRAM(s) IV Push daily  heparin   Injectable 5000 Unit(s) SubCutaneous every 8 hours  hydrALAZINE 50 milliGRAM(s) Oral three times a day  latanoprost 0.005% Ophthalmic Solution 1 Drop(s) Both EYES at bedtime  metoprolol tartrate 50 milliGRAM(s) Oral three times a day  montelukast 10 milliGRAM(s) Oral daily  sevelamer carbonate 800 milliGRAM(s) Oral three times a day with meals  sodium bicarbonate 650 milliGRAM(s) Oral three times a day  zinc sulfate 220 milliGRAM(s) Oral daily     	  	  LABS:	 	                       8.8    5.88  )-----------( 93       ( 15 Allan 2021 07:02 )             28.7     01-15    142  |  107  |  x   ----------------------------<  154<H>  4.1   |  19<L>  |  5.26<H>    Ca    8.3<L>      15 Allan 2021 07:02  Phos  6.3     01-15  Mg     1.9     01-15    TPro  5.9<L>  /  Alb  2.3<L>  /  TBili  0.7  /  DBili  x   /  AST  17  /  ALT  22  /  AlkPhos  75  01-15    proBNP: Serum Pro-Brain Natriuretic Peptide: 9057 pg/mL (01-12 @ 16:37)    Lipid Profile: Cholesterol 104  LDL --  HDL 41        TSH: Thyroid Stimulating Hormone, Serum: 0.57 uU/mL (01-13 @ 07:25)

## 2021-01-15 NOTE — PROGRESS NOTE ADULT - ASSESSMENT
1. Hypoxic Respiratory Failure 2nd to PNA/Covid   - Covid detected.   - IGG positive   - CXR noted - b/l infiltrates   - S/P RRT for Respiratory distress.   - ICU consulted - no escalation of care at present.   - Vit C, Vit D, Zinc   - Continue Dexamethasone    - Continue Singulair and Pepcid   - Robitussin PRN for cough  - Tylenol PRN for temp   - O2 Supp - on NRM - taper as feasible   - Monitor O2 Sat.  - Monitor labs  - F.U CXR  - Unable to obtain VQ or CT given CKD   - Monitor labs: ferritin, ddimer, ESR, LDH, Procalcitonin, CRP and lactate  - Prone positioning as tolerated   - Pulm F/U   - ID F/U   - Isolation precautions   - DVT and GI PPX     2. CKD   - Creat 5.26  - Avoid nephrotoxic drugs  - Renal F/U     3. Elevated Troponin  - Tele monitoring  - Echo   - F/U CE x 3.   - ACS protocol  - Cardio F/U     4. Gout  - Continue Allopurinol   - Not in acute exacerbation

## 2021-01-15 NOTE — PROGRESS NOTE ADULT - ASSESSMENT
Patient is a 89y old  Male with medical history significant for HTN, HLD, CKD, and Gout, now presents to the ER for evaluation of worsening SOB . Pt was recently discharged from [facility] on 1/5/21 where he tested positive for COVID-19. He was d/c on Dexamethasone and Eliquis. He states that he was having increasing difficulty breathing, even on minimal ambulation. Also started having bleed from nose and gums. He has no fever, chest pain or any other acute complaints. On admission, he has no fever but hypoxic, hence ICU was consulted since patient is on NRB 15 L, initially. Patient was given dose of decadron, and his respiratory status improved, saturating 99% on NRB. So patient was downgraded to medicine.  Now the ID consult requested to assist with further evaluation and  management.     # COVID pneumonia with hypoxia- on supplemental oxygenation  # Positive urine CX- in the setting of negative UA, will repeat UA    would recommend:    1. Please obtain repeat UA  2. Continue Supplemental oxygenation and bronchodilator as needed  3. Aspiration precaution  4. Continue Dexamethasone and hold Remdesivir due to kidney insufficiency  5. COVID precaution  6. Continue supportive care    d/w Nursing staff     Attending Attestation:    Spent more than 45 minutes on total encounter, more than 50 % of the visit was spent counseling and/or coordinating care by the Attending physician.

## 2021-01-15 NOTE — PROGRESS NOTE ADULT - SUBJECTIVE AND OBJECTIVE BOX
Patient is a 89y old  Male who presents with a chief complaint of respiratory distress (15 Allan 2021 12:22)  Patient is lethargic, arousable, laying in bed in NAD. Currently on NRBM    INTERVAL HPI/OVERNIGHT EVENTS:      VITAL SIGNS:  T(F): 96.5 (01-15-21 @ 11:31)  HR: 82 (01-15-21 @ 11:31)  BP: 128/40 (01-15-21 @ 11:31)  RR: 19 (01-15-21 @ 11:31)  SpO2: 97% (01-15-21 @ 11:31)  Wt(kg): --  I&O's Detail    14 Jan 2021 07:01  -  15 Allan 2021 07:00  --------------------------------------------------------  IN:    dextrose 5% + sodium chloride 0.45% w/ Additives: 540 mL    Oral Fluid: 300 mL  Total IN: 840 mL    OUT:  Total OUT: 0 mL    Total NET: 840 mL      15 Allan 2021 07:01  -  15 Allan 2021 12:51  --------------------------------------------------------  IN:    dextrose 5% + sodium chloride 0.45% w/ Additives: 300 mL  Total IN: 300 mL    OUT:    Voided (mL): 450 mL  Total OUT: 450 mL    Total NET: -150 mL              REVIEW OF SYSTEMS:    CONSTITUTIONAL:  No fevers, chills, sweats    HEENT:  Eyes:  No diplopia or blurred vision. ENT:  No earache, sore throat or runny nose.    CARDIOVASCULAR:  No pressure, squeezing, tightness, or heaviness about the chest; no palpitations.    RESPIRATORY:  Per HPI    GASTROINTESTINAL:  No abdominal pain, nausea, vomiting or diarrhea.    GENITOURINARY:  No dysuria, frequency or urgency.    NEUROLOGIC:  No paresthesias, fasciculations, seizures or weakness.    PSYCHIATRIC:  No disorder of thought or mood.      PHYSICAL EXAM:    Constitutional: Well developed and nourished  Eyes:Perrla  ENMT: normal  Neck:supple  Respiratory: good air entry  Cardiovascular: S1 S2 regular  Gastrointestinal: Soft, Non tender  Extremities: No edema  Vascular:normal  Neurological:Awake, alert,Ox3  Musculoskeletal:Normal      MEDICATIONS  (STANDING):  ALBUTerol    90 MICROgram(s) HFA Inhaler 1 Puff(s) Inhalation every 4 hours  allopurinol 100 milliGRAM(s) Oral at bedtime  amLODIPine   Tablet 10 milliGRAM(s) Oral daily  ascorbic acid 500 milliGRAM(s) Oral daily  cholecalciferol 2000 Unit(s) Oral daily  dexAMETHasone  Injectable 6 milliGRAM(s) IV Push daily  dextrose 5% + sodium chloride 0.45% 1000 milliLiter(s) (60 mL/Hr) IV Continuous <Continuous>  epoetin jose-epbx (RETACRIT) Injectable 23445 Unit(s) SubCutaneous <User Schedule>  famotidine Injectable 20 milliGRAM(s) IV Push daily  heparin   Injectable 5000 Unit(s) SubCutaneous every 8 hours  hydrALAZINE 50 milliGRAM(s) Oral three times a day  latanoprost 0.005% Ophthalmic Solution 1 Drop(s) Both EYES at bedtime  metoprolol tartrate 50 milliGRAM(s) Oral three times a day  montelukast 10 milliGRAM(s) Oral daily  sevelamer carbonate 800 milliGRAM(s) Oral three times a day with meals  sodium bicarbonate 650 milliGRAM(s) Oral three times a day  zinc sulfate 220 milliGRAM(s) Oral daily    MEDICATIONS  (PRN):  acetaminophen   Tablet .. 650 milliGRAM(s) Oral every 6 hours PRN Temp greater or equal to 38C (100.4F)  guaiFENesin  milliGRAM(s) Oral every 12 hours PRN Cough      Allergies    No Known Allergies    Intolerances        LABS:                        8.8    5.88  )-----------( 93       ( 15 Allan 2021 07:02 )             28.7     01-15    142  |  107  |  186  ----------------------------<  154<H>  4.1   |  19<L>  |  5.26<H>    Ca    8.3<L>      15 Allan 2021 07:02  Phos  6.3     01-15  Mg     1.9     01-15    TPro  5.9<L>  /  Alb  2.3<L>  /  TBili  0.7  /  DBili  x   /  AST  17  /  ALT  22  /  AlkPhos  75  01-15              CAPILLARY BLOOD GLUCOSE        pro-bnp -- 01-15 @ 07:02     d-dimer 4239  01-15 @ 07:02  pro-bnp 9057 01-12 @ 16:37     d-dimer --  01-12 @ 16:37      RADIOLOGY & ADDITIONAL TESTS:    CXR:  < from: Xray Chest 1 View-PORTABLE IMMEDIATE (01.12.21 @ 15:15) >  IMPRESSION: Bilateral lung parenchymal infiltrates identified with appearance suggestive for atypical viral pneumonia/Covid 19.    < end of copied text >    Ct scan chest:    ekg;    echo:

## 2021-01-15 NOTE — PROGRESS NOTE ADULT - SUBJECTIVE AND OBJECTIVE BOX
pt seen in icu [  ], reg med floor [ x  ], bed [ x ], chair at bedside [   ]    REVIEW OF SYSTEMS:    CONSTITUTIONAL: No weakness, fevers or chills  EYES/ENT: No visual changes;  No vertigo or throat pain   NECK: No pain or stiffness  RESPIRATORY: No cough, wheezing, hemoptysis; No shortness of breath  CARDIOVASCULAR: No chest pain or palpitations  GASTROINTESTINAL: No abdominal or epigastric pain. No nausea, vomiting, or hematemesis; No diarrhea or constipation. No melena or hematochezia.  GENITOURINARY: No dysuria, frequency or hematuria  NEUROLOGICAL: No numbness or weakness  SKIN: No itching, burning, rashes, or lesions   All other review of systems is negative unless indicated above.    Physical Exam    General: WN/WD NAD  Neurology: A&Ox3, nonfocal, GONZALEZ x 4  Respiratory: CTA B/L  CV: RRR, S1S2, no murmurs, rubs or gallops  Abdominal: Soft, NT, ND +BS, Last BM  Extremities: No edema, + peripheral pulses      Allergies  No Known Allergies      Health Issues  Pneumonia due to organism    Gout    CKD (chronic kidney disease)    HLD (hyperlipidemia)    HTN (hypertension)    No significant past surgical history        Vitals  T(F): 96.5 (01-15-21 @ 11:31), Max: 98.9 (01-14-21 @ 20:35)  HR: 82 (01-15-21 @ 11:31) (82 - 95)  BP: 128/40 (01-15-21 @ 11:31) (115/44 - 158/59)  RR: 19 (01-15-21 @ 11:31) (19 - 20)  SpO2: 97% (01-15-21 @ 11:31) (93% - 97%)  Wt(kg): --  CAPILLARY BLOOD GLUCOSE      POCT Blood Glucose.: 144 mg/dL (14 Jan 2021 05:37)      Labs                          8.8    5.88  )-----------( 93       ( 15 Allan 2021 07:02 )             28.7       01-15    142  |  107  |  186  ----------------------------<  154<H>  4.1   |  19<L>  |  5.26<H>    Ca    8.3<L>      15 Allan 2021 07:02  Phos  6.3     01-15  Mg     1.9     01-15    TPro  5.9<L>  /  Alb  2.3<L>  /  TBili  0.7  /  DBili  x   /  AST  17  /  ALT  22  /  AlkPhos  75  01-15            Radiology Results      Meds    MEDICATIONS  (STANDING):  ALBUTerol    90 MICROgram(s) HFA Inhaler 1 Puff(s) Inhalation every 4 hours  allopurinol 100 milliGRAM(s) Oral at bedtime  amLODIPine   Tablet 10 milliGRAM(s) Oral daily  ascorbic acid 500 milliGRAM(s) Oral daily  cholecalciferol 2000 Unit(s) Oral daily  dexAMETHasone  Injectable 6 milliGRAM(s) IV Push daily  dextrose 5% + sodium chloride 0.45% 1000 milliLiter(s) (60 mL/Hr) IV Continuous <Continuous>  epoetin jose-epbx (RETACRIT) Injectable 26245 Unit(s) SubCutaneous <User Schedule>  famotidine Injectable 20 milliGRAM(s) IV Push daily  heparin   Injectable 5000 Unit(s) SubCutaneous every 8 hours  hydrALAZINE 50 milliGRAM(s) Oral three times a day  latanoprost 0.005% Ophthalmic Solution 1 Drop(s) Both EYES at bedtime  metoprolol tartrate 50 milliGRAM(s) Oral three times a day  montelukast 10 milliGRAM(s) Oral daily  sevelamer carbonate 800 milliGRAM(s) Oral three times a day with meals  sodium bicarbonate 650 milliGRAM(s) Oral three times a day  zinc sulfate 220 milliGRAM(s) Oral daily      MEDICATIONS  (PRN):  acetaminophen   Tablet .. 650 milliGRAM(s) Oral every 6 hours PRN Temp greater or equal to 38C (100.4F)  guaiFENesin  milliGRAM(s) Oral every 12 hours PRN Cough

## 2021-01-15 NOTE — PROGRESS NOTE ADULT - PROBLEM SELECTOR PLAN 4
Has h/o ckd stage , not a candidate for HD due to his age as per family   unknown baseline  Not starting remdesivir  Avoid nephrotoxins  Pt with KARIN on CKD. Appears dry  -Starts on fluids D5 and 1/2 NS  Nephrology Dr Randall -Has h/o ckd stage 5 , not a candidate for HD due to his age as per his nephrologist OP   -unknown baseline  -Not starting remdesivir  Avoid nephrotoxins  Pt with KARIN on CKD. Appears dry  -Starts on fluids D5 and 1/2 NS with bicarb  Nephrology Dr Randall

## 2021-01-15 NOTE — PROGRESS NOTE ADULT - SUBJECTIVE AND OBJECTIVE BOX
Problem List:  CKD stage 5,   COVID 19      PAST MEDICAL & SURGICAL HISTORY:  Gout  CKD (chronic kidney disease)  HLD (hyperlipidemia)  HTN (hypertension)  No significant past surgical history        No Known Allergies      MEDICATIONS  (STANDING):  ALBUTerol    90 MICROgram(s) HFA Inhaler 1 Puff(s) Inhalation every 4 hours  allopurinol 100 milliGRAM(s) Oral at bedtime  amLODIPine   Tablet 10 milliGRAM(s) Oral daily  ascorbic acid 500 milliGRAM(s) Oral daily  cholecalciferol 2000 Unit(s) Oral daily  dexAMETHasone  Injectable 6 milliGRAM(s) IV Push daily  dextrose 5% + sodium chloride 0.45% 1000 milliLiter(s) (60 mL/Hr) IV Continuous <Continuous>  epoetin jose-epbx (RETACRIT) Injectable 64722 Unit(s) SubCutaneous <User Schedule>  famotidine Injectable 20 milliGRAM(s) IV Push daily  heparin   Injectable 5000 Unit(s) SubCutaneous every 8 hours  hydrALAZINE 50 milliGRAM(s) Oral three times a day  latanoprost 0.005% Ophthalmic Solution 1 Drop(s) Both EYES at bedtime  metoprolol tartrate 50 milliGRAM(s) Oral three times a day  montelukast 10 milliGRAM(s) Oral daily  sevelamer carbonate 800 milliGRAM(s) Oral three times a day with meals  sodium bicarbonate 650 milliGRAM(s) Oral three times a day  zinc sulfate 220 milliGRAM(s) Oral daily    MEDICATIONS  (PRN):  acetaminophen   Tablet .. 650 milliGRAM(s) Oral every 6 hours PRN Temp greater or equal to 38C (100.4F)  guaiFENesin  milliGRAM(s) Oral every 12 hours PRN Cough                            8.8    5.88  )-----------( 93       ( 15 Allan 2021 07:02 )             28.7     01-15    142  |  107  |  186  ----------------------------<  154<H>  4.1   |  19<L>  |  5.26<H>    Ca    8.3<L>      15 Allan 2021 07:02  Phos  6.3     01-15  Mg     1.9     01-15    TPro  5.9<L>  /  Alb  2.3<L>  /  TBili  0.7  /  DBili  x   /  AST  17  /  ALT  22  /  AlkPhos  75  01-15        Sodium, Random Urine: 54 mmol/L (01-14 @ 14:27)  Creatinine, Random Urine: 49 mg/dL (01-14 @ 14:27)  Calcium, Random Urine: 3.8 mg/dL (01-13 @ 06:10)  Creatinine, Random Urine: 51 mg/dL (01-13 @ 00:14)  Chloride, Random Urine: 41 mmol/L (01-13 @ 00:14)  Osmolality, Random Urine: 392 mos/kg (01-13 @ 00:14)  Sodium, Random Urine: 47 mmol/L (01-13 @ 00:14)      REVIEW OF SYSTEMS:        VITALS:  T(F): 97 (01-15-21 @ 16:27), Max: 98.9 (01-14-21 @ 20:35)  HR: 103 (01-15-21 @ 16:27)  BP: 130/50 (01-15-21 @ 16:27)  RR: 20 (01-15-21 @ 16:27)  SpO2: 100% (01-15-21 @ 16:27)  Wt(kg): --    01-14 @ 07:01  -  01-15 @ 07:00  --------------------------------------------------------  IN: 840 mL / OUT: 0 mL / NET: 840 mL    01-15 @ 07:01  -  01-15 @ 19:10  --------------------------------------------------------  IN: 720 mL / OUT: 800 mL / NET: -80 mL      Height (cm): 165.1 (01-15 @ 04:50)  PHYSICAL EXAM:  Constitutional: well developed, no diaphoresis, no distress.  Neck: No JVD, no carotid bruit, supple, no adenopathy  Respiratory: air entrance B/L, no wheezes, rales or rhonchi  Cardiovascular: S1, S2, RRR, no pericardial rub, no murmur  Abdomen: BS+, soft, no tenderness, no bruit  Pelvis: bladder nondistended  Extremities: No cyanosis or clubbing. No peripheral edema.

## 2021-01-16 NOTE — PROGRESS NOTE ADULT - SUBJECTIVE AND OBJECTIVE BOX
pt seen in icu [  ], reg med floor [  x ], bed [ x ], chair at bedside [   ]    REVIEW OF SYSTEMS:    CONSTITUTIONAL: No weakness, fevers or chills  EYES/ENT: No visual changes;  No vertigo or throat pain   NECK: No pain or stiffness  RESPIRATORY: No cough, wheezing, hemoptysis; No shortness of breath  CARDIOVASCULAR: No chest pain or palpitations  GASTROINTESTINAL: No abdominal or epigastric pain. No nausea, vomiting, or hematemesis; No diarrhea or constipation. No melena or hematochezia.  GENITOURINARY: No dysuria, frequency or hematuria  NEUROLOGICAL: No numbness or weakness  SKIN: No itching, burning, rashes, or lesions   All other review of systems is negative unless indicated above.    Physical Exam    General: WN/WD NAD  Neurology: A&Ox3, nonfocal, GONZALEZ x 4  Respiratory: Short of breath on NRM   CV: RRR, S1S2, no murmurs, rubs or gallops  Abdominal: Soft, NT, ND +BS, Last BM  Extremities: No edema, + peripheral pulses      Allergies  No Known Allergies      Health Issues  Pneumonia due to organism    Gout    CKD (chronic kidney disease)    HLD (hyperlipidemia)    HTN (hypertension)    No significant past surgical history        Vitals  T(F): 97.6 (01-16-21 @ 11:54), Max: 97.9 (01-16-21 @ 05:26)  HR: 80 (01-16-21 @ 11:54) (80 - 103)  BP: 126/64 (01-16-21 @ 11:54) (112/50 - 136/65)  RR: 19 (01-16-21 @ 11:54) (19 - 20)  SpO2: 97% (01-16-21 @ 11:54) (95% - 100%)  Wt(kg): --  CAPILLARY BLOOD GLUCOSE          Labs                          9.8    6.84  )-----------( 106      ( 16 Jan 2021 06:52 )             32.0       01-16    142  |  104  |  188<H>  ----------------------------<  157<H>  4.1   |  21<L>  |  5.27<H>    Ca    8.2<L>      16 Jan 2021 06:52  Phos  7.0     01-16  Mg     1.9     01-16    TPro  6.3  /  Alb  2.2<L>  /  TBili  0.8  /  DBili  x   /  AST  18  /  ALT  22  /  AlkPhos  82  01-16            Radiology Results      Meds    MEDICATIONS  (STANDING):  ALBUTerol    90 MICROgram(s) HFA Inhaler 1 Puff(s) Inhalation every 4 hours  allopurinol 100 milliGRAM(s) Oral at bedtime  amLODIPine   Tablet 10 milliGRAM(s) Oral daily  ascorbic acid 500 milliGRAM(s) Oral daily  cholecalciferol 2000 Unit(s) Oral daily  dexAMETHasone  Injectable 6 milliGRAM(s) IV Push daily  dextrose 5% + sodium chloride 0.45% 1000 milliLiter(s) (60 mL/Hr) IV Continuous <Continuous>  epoetin jose-epbx (RETACRIT) Injectable 21552 Unit(s) SubCutaneous <User Schedule>  famotidine Injectable 20 milliGRAM(s) IV Push daily  heparin   Injectable 5000 Unit(s) SubCutaneous every 8 hours  hydrALAZINE 50 milliGRAM(s) Oral three times a day  latanoprost 0.005% Ophthalmic Solution 1 Drop(s) Both EYES at bedtime  metoprolol tartrate 50 milliGRAM(s) Oral three times a day  montelukast 10 milliGRAM(s) Oral daily  potassium chloride    Tablet ER 20 milliEquivalent(s) Oral two times a day  sevelamer carbonate 800 milliGRAM(s) Oral three times a day with meals  sodium bicarbonate 650 milliGRAM(s) Oral three times a day  zinc sulfate 220 milliGRAM(s) Oral daily      MEDICATIONS  (PRN):  acetaminophen   Tablet .. 650 milliGRAM(s) Oral every 6 hours PRN Temp greater or equal to 38C (100.4F)  guaiFENesin  milliGRAM(s) Oral every 12 hours PRN Cough

## 2021-01-16 NOTE — CONSULT NOTE ADULT - CONSULT REASON
Acute Hypoxic respiratory failure
SOB
Positive COVID
renal failure CKD stage 5  Uremia
goals of care, support

## 2021-01-16 NOTE — PROGRESS NOTE ADULT - ASSESSMENT
89 year old [place] male with medical history significant for HTN, HLD, CKD, and Gout and no significant surgical history presents to ED c/o worsening SOB since this morning.    ED:  ICU was consulted as pt was hypoxic and on NRB 15 L . Pt was seen at bedside and was saturating 97 % on NRB.   ABG was noted, Likely 2/2 COVID  CXR : Bilateral lung parenchymal infiltrates identified with appearance suggestive for atypical viral pneumonia/Covid 19.  EKG : NSR      Patient is being admitted to [facility] for acute hypoxic respiratory failure 2/2 covid and elevated troponin likely 2/2 demand ischemia.

## 2021-01-16 NOTE — PROGRESS NOTE ADULT - SUBJECTIVE AND OBJECTIVE BOX
Patient is seen and examined at the bed side, is afebrile. He remains on NRB, 15 Liter oxygen, and became little agitated.       REVIEW OF SYSTEMS: All other review systems are negative      ALLERGIES: No Known Allergies      Vital Signs Last 24 Hrs  T(C): 36.4 (2021 11:54), Max: 36.6 (2021 05:26)  T(F): 97.6 (2021 11:54), Max: 97.9 (2021 05:26)  HR: 80 (2021 11:54) (80 - 103)  BP: 126/64 (2021 11:54) (112/50 - 136/65)  BP(mean): --  RR: 19 (2021 11:54) (19 - 20)  SpO2: 97% (2021 11:54) (95% - 100%)      PHYSICAL EXAM:  GENERAL: Not in distress, on 15 Liter oxygen   CHEST/LUNG: Not using accessory muscles   HEART: s1 and s2 present  ABDOMEN:  Nontender and  Nondistended  EXTREMITIES: No pedal  edema  CNS: Awake and Alert      LABS:                           9.8    6.84  )-----------( 106      ( 2021 06:52 )             32.0                                   8.8    5.88  )-----------( 93       ( 15 Allan 2021 07:02 )             28.7         142  |  104  |  188<H>  ----------------------------<  157<H>  4.1   |  21<L>  |  5.27<H>    Ca    8.2<L>      2021 06:52  Phos  7.0       Mg     1.9         TPro  6.3  /  Alb  2.2<L>  /  TBili  0.8  /  DBili  x   /  AST  18  /  ALT  22  /  AlkPhos  82  -16      01-15    142  |  107  |  186  ----------------------------<  154<H>  4.1   |  19<L>  |  5.26<H>    Ca    8.3<L>      15 Allan 2021 07:02  Phos  6.3     01-15  Mg     1.9     01-15    TPro  5.9<L>  /  Alb  2.3<L>  /  TBili  0.7  /  DBili  x   /  AST  17  /  ALT  22  /  AlkPhos  75  01-15    01-14    144  |  109<H>  |  181<H>  ----------------------------<  146<H>  4.0   |  16<L>  |  5.16<H>    Ca    8.6      2021 07:51  Phos  5.8       Mg     2.0         TPro  6.0  /  Alb  2.4<L>  /  TBili  0.7  /  DBili  x   /  AST  17  /  ALT  32  /  AlkPhos  69      PT/INR - ( 2021 07:25 )   PT: 19.9 sec;   INR: 1.72 ratio           CAPILLARY BLOOD GLUCOSE  POCT Blood Glucose.: 144 mg/dL (2021 05:37)        Urinalysis Basic - ( 2021 00:14 )  Color: Yellow / Appearance: Clear / S.015 / pH: x  Gluc: x / Ketone: Negative  / Bili: Negative / Urobili: Negative   Blood: x / Protein: 100 / Nitrite: Negative   Leuk Esterase: Trace / RBC: 10-25 /HPF / WBC 3-5 /HPF   Sq Epi: x / Non Sq Epi: Few /HPF / Bacteria: Few /HPF        MEDICATIONS  (STANDING):    ALBUTerol    90 MICROgram(s) HFA Inhaler 1 Puff(s) Inhalation every 4 hours  allopurinol 100 milliGRAM(s) Oral at bedtime  amLODIPine   Tablet 10 milliGRAM(s) Oral daily  ascorbic acid 500 milliGRAM(s) Oral daily  cholecalciferol 2000 Unit(s) Oral daily  dexAMETHasone  Injectable 6 milliGRAM(s) IV Push daily  dextrose 5% + sodium chloride 0.45% 1000 milliLiter(s) (60 mL/Hr) IV Continuous <Continuous>  epoetin jose-epbx (RETACRIT) Injectable 12185 Unit(s) SubCutaneous <User Schedule>  famotidine Injectable 20 milliGRAM(s) IV Push daily  heparin   Injectable 5000 Unit(s) SubCutaneous every 8 hours  hydrALAZINE 50 milliGRAM(s) Oral three times a day  latanoprost 0.005% Ophthalmic Solution 1 Drop(s) Both EYES at bedtime  metoprolol tartrate 50 milliGRAM(s) Oral three times a day  montelukast 10 milliGRAM(s) Oral daily  potassium chloride    Tablet ER 20 milliEquivalent(s) Oral two times a day  sevelamer carbonate 800 milliGRAM(s) Oral three times a day with meals  sodium bicarbonate 650 milliGRAM(s) Oral three times a day  zinc sulfate 220 milliGRAM(s) Oral daily        RADIOLOGY & ADDITIONAL TESTS:    21 : Xray Chest 1 View-PORTABLE IMMEDIATE (21 @ 15:15) : Bilateral lung parenchymal infiltrates identified with appearance suggestive for atypical viral pneumonia/Covid 19.      MICROBIOLOGY DATA:    Culture - Urine (21 @ 07:05)   - Amikacin: S <=16   - Aztreonam: S <=4   - Cefepime: S 4   - Ceftazidime: S 4   - Ciprofloxacin: S <=0.25   - Gentamicin: I 8   - Imipenem: I 4   - Levofloxacin: S 1   - Meropenem: S <=1   - Piperacillin/Tazobactam: S <=8   - Tobramycin: S <=2   Specimen Source: .Urine Clean Catch (Midstream)   Culture Results:   10,000 - 49,000 CFU/mL Pseudomonas aeruginosa   Organism Identification: Pseudomonas aeruginosa   Organism: Pseudomonas aeruginosa   Method Type: LINNETTE ood (21 @ 22:10)     Specimen Source: .Blood Blood-Peripheral   Culture Results: No growth to date.     Culture - Blood (21 @ 22:10)   Specimen Source: .Blood Blood-Peripheral   Culture Results: No growth to date.              Patient is seen and examined at the bed side, is afebrile. He remains on NRB, 15 Liter oxygen, no change in condition.      REVIEW OF SYSTEMS: All other review systems are negative      ALLERGIES: No Known Allergies      Vital Signs Last 24 Hrs  T(C): 36.4 (2021 11:54), Max: 36.6 (2021 05:26)  T(F): 97.6 (2021 11:54), Max: 97.9 (2021 05:26)  HR: 80 (2021 11:54) (80 - 103)  BP: 126/64 (2021 11:54) (112/50 - 136/65)  BP(mean): --  RR: 19 (2021 11:54) (19 - 20)  SpO2: 97% (2021 11:54) (95% - 100%)      PHYSICAL EXAM:  GENERAL: Not in distress, on 15 Liter oxygen   CHEST/LUNG: Not using accessory muscles   HEART: s1 and s2 present  ABDOMEN:  Nontender and  Nondistended  EXTREMITIES: No pedal  edema  CNS: Awake and Alert      LABS:                           9.8    6.84  )-----------( 106      ( 2021 06:52 )             32.0                                   8.8    5.88  )-----------( 93       ( 15 Allan 2021 07:02 )             28.7     16    142  |  104  |  188<H>  ----------------------------<  157<H>  4.1   |  21<L>  |  5.27<H>    Ca    8.2<L>      2021 06:52  Phos  7.0       Mg     1.9         TPro  6.3  /  Alb  2.2<L>  /  TBili  0.8  /  DBili  x   /  AST  18  /  ALT  22  /  AlkPhos  82  -16      01-15    142  |  107  |  186  ----------------------------<  154<H>  4.1   |  19<L>  |  5.26<H>    Ca    8.3<L>      15 Allan 2021 07:02  Phos  6.3     01-15  Mg     1.9     01-15    TPro  5.9<L>  /  Alb  2.3<L>  /  TBili  0.7  /  DBili  x   /  AST  17  /  ALT  22  /  AlkPhos  75  01-15    01-14    144  |  109<H>  |  181<H>  ----------------------------<  146<H>  4.0   |  16<L>  |  5.16<H>    Ca    8.6      2021 07:51  Phos  5.8       Mg     2.0         TPro  6.0  /  Alb  2.4<L>  /  TBili  0.7  /  DBili  x   /  AST  17  /  ALT  32  /  AlkPhos  69      PT/INR - ( 2021 07:25 )   PT: 19.9 sec;   INR: 1.72 ratio           CAPILLARY BLOOD GLUCOSE  POCT Blood Glucose.: 144 mg/dL (2021 05:37)        Urinalysis Basic - ( 2021 00:14 )  Color: Yellow / Appearance: Clear / S.015 / pH: x  Gluc: x / Ketone: Negative  / Bili: Negative / Urobili: Negative   Blood: x / Protein: 100 / Nitrite: Negative   Leuk Esterase: Trace / RBC: 10-25 /HPF / WBC 3-5 /HPF   Sq Epi: x / Non Sq Epi: Few /HPF / Bacteria: Few /HPF        MEDICATIONS  (STANDING):    ALBUTerol    90 MICROgram(s) HFA Inhaler 1 Puff(s) Inhalation every 4 hours  allopurinol 100 milliGRAM(s) Oral at bedtime  amLODIPine   Tablet 10 milliGRAM(s) Oral daily  ascorbic acid 500 milliGRAM(s) Oral daily  cholecalciferol 2000 Unit(s) Oral daily  dexAMETHasone  Injectable 6 milliGRAM(s) IV Push daily  dextrose 5% + sodium chloride 0.45% 1000 milliLiter(s) (60 mL/Hr) IV Continuous <Continuous>  epoetin jose-epbx (RETACRIT) Injectable 05060 Unit(s) SubCutaneous <User Schedule>  famotidine Injectable 20 milliGRAM(s) IV Push daily  heparin   Injectable 5000 Unit(s) SubCutaneous every 8 hours  hydrALAZINE 50 milliGRAM(s) Oral three times a day  latanoprost 0.005% Ophthalmic Solution 1 Drop(s) Both EYES at bedtime  metoprolol tartrate 50 milliGRAM(s) Oral three times a day  montelukast 10 milliGRAM(s) Oral daily  potassium chloride    Tablet ER 20 milliEquivalent(s) Oral two times a day  sevelamer carbonate 800 milliGRAM(s) Oral three times a day with meals  sodium bicarbonate 650 milliGRAM(s) Oral three times a day  zinc sulfate 220 milliGRAM(s) Oral daily        RADIOLOGY & ADDITIONAL TESTS:    21 : Xray Chest 1 View-PORTABLE IMMEDIATE (21 @ 15:15) : Bilateral lung parenchymal infiltrates identified with appearance suggestive for atypical viral pneumonia/Covid 19.      MICROBIOLOGY DATA:      Culture - Urine (21 @ 07:05)   - Amikacin: S <=16   - Aztreonam: S <=4   - Cefepime: S 4   - Ceftazidime: S 4   - Ciprofloxacin: S <=0.25   - Gentamicin: I 8   - Imipenem: I 4   - Levofloxacin: S 1   - Meropenem: S <=1   - Piperacillin/Tazobactam: S <=8   - Tobramycin: S <=2   Specimen Source: .Urine Clean Catch (Midstream)   Culture Results:   10,000 - 49,000 CFU/mL Pseudomonas aeruginosa   Organism Identification: Pseudomonas aeruginosa   Organism: Pseudomonas aeruginosa   Method Type: LINNETTE ood (21 @ 22:10)     Specimen Source: .Blood Blood-Peripheral   Culture Results: No growth to date.     Culture - Blood (21 @ 22:10)   Specimen Source: .Blood Blood-Peripheral   Culture Results: No growth to date.

## 2021-01-16 NOTE — CONSULT NOTE ADULT - PROBLEM SELECTOR RECOMMENDATION 5
-Spoke to patient's son, Petar.  Introduced role of palliative care in symptom management, care planning, support, and transitions in care to those with serious illness.  Emotional support offered.  -Petar shares that -Spoke to patient's son, Petar.  Introduced role of palliative care in symptom management, care planning, support, and transitions in care to those with serious illness.  Emotional support offered.  -Petar shares that he is the HCP and will provide documentation to this effect.  -He reports that the patient has been calling him and his wife today, stating that he is dying and would like to be home.  Son reports that he would like to tim him his wish and get him home.  -Reviewed COVID status, infection risk, critical illness.  -He states that he was d/gamaliel home from [place] with COVID, family aware of risks and has separate space for him at home.  -Discuss that his current oxygen requirements are incompatible with home.  Discussed risk of him dying without support.  He states that he and his family have been told by his nephrologist, Dr. Azevedo that his time is limited to a few months, and he wants to bring him home to honor his dying wishes.  -Son is enroute here with a plan to  pt, states that he wants him ready to go.  -Above conversation communicated to Jreemie Marquez and Cyndi  -Full goals of care conversation re: care plan limited given current situation. -Spoke to patient's son, Petar.  Introduced role of palliative care in symptom management, care planning, support, and transitions in care to those with serious illness.  Emotional support offered.  -Petar shares that he is the HCP and will provide documentation to this effect.  -He reports that the patient has been calling him and his wife today, stating that he is dying and would like to be home.  Son reports that he would like to tim him his wish and get him home.  -Reviewed COVID status, infection risk, critical illness.  -He states that he was d/gamaliel home from [place] with COVID, family aware of risks and has separate space for him at home.  -Discuss that his current oxygen requirements are incompatible with home.  Discussed risk of him dying without support.  He states that he and his family have been told by his nephrologist, Dr. Azevedo that his time is limited to a few months, and he wants to bring him home to honor his dying wishes.    -He states that patient does not to die in the hospital; he wants to be at home.  To this end, he states that CPR and intubation should be avoided.  -Son is enroute here with a plan to  pt, states that he wants him ready to go.  -Above conversation communicated to Jeremie Marquez and Cyndi  -Full goals of care conversation re: care plan limited given current situation.  Per Dr. Hanna, ongoing discussion to take place between patient's son and Dr. Marquez; code status to be verified/confirmed by Dr. Marquez. -Spoke to patient's son, Rebel.  Introduced role of palliative care in symptom management, care planning, support, and transitions in care to those with serious illness.  Emotional support offered.  -Petar shares that he is the HCP and will provide documentation to this effect.  -He reports that the patient has been calling him and his wife today, stating that he is dying and would like to be home.  Son reports that he would like to tim him his wish and get him home.  -Reviewed COVID status, infection risk, critical illness.  -He states that he was d/gamaliel home from [place] with COVID, family aware of risks and has separate space for him at home.  -Discuss that his current oxygen requirements are incompatible with home.  Discussed risk of him dying without support.  He states that he and his family have been told by his nephrologist, Dr. Azevedo that his time is limited to a few months, and he wants to bring him home to honor his dying wishes.    -He states that patient does not to die in the hospital; he wants to be at home.  To this end, he states that CPR and intubation should be avoided.  -Son is enroute here with a plan to  pt, states that he wants him ready to go.  -Above conversation communicated to Jeremie Marquez and Cyndi  -Full goals of care conversation re: care plan limited given current situation.  Per Dr. Hanna, ongoing discussion to take place between patient's son and Dr. Marquez; code status to be verified/confirmed by Dr. Marquez. -Spoke to patient's son, Rebel.  Introduced role of palliative care in symptom management, care planning, support, and transitions in care to those with serious illness.  Emotional support offered.  -Rebel shares that he is the HCP and will provide documentation to this effect.  -He reports that the patient has been calling him and his wife today, stating that he is dying and would like to be home.  Son reports that he would like to tim him his wish and get him home.  -Reviewed COVID status, infection risk, critical illness.  -He states that he was d/gamaliel home from [place] with COVID, family aware of risks and has separate space for him at home.  -Discuss that his current oxygen requirements are incompatible with home.  Discussed risk of him dying without support.  He states that he and his family have been told by his nephrologist, Dr. Azevedo that his time is limited to a few months, and he wants to bring him home to honor his dying wishes.    -He states that patient does not to die in the hospital; he wants to be at home.  To this end, he states that CPR and intubation should be avoided.  -Son is enroute here with a plan to  pt, states that he wants him ready to go.  -Above conversation communicated to Jeremie Marquez and Cyndi  -Full goals of care conversation re: care plan limited given current situation.  Per Dr. Hanna, ongoing discussion to take place between patient's son and Dr. Marquez; code status to be verified/confirmed by Dr. Marquez.

## 2021-01-16 NOTE — PROGRESS NOTE ADULT - ASSESSMENT
1. Hypoxic Respiratory Failure 2nd to PNA/Covid   - Covid detected.   - IGG positive   - CXR noted - b/l infiltrates   - S/P RRT for Respiratory distress.   - ICU consulted - no escalation of care at present.   - Vit C, Vit D, Zinc   - Continue Dexamethasone    - Continue Singulair and Pepcid   - Robitussin PRN for cough  - Tylenol PRN for temp   - O2 Supp - on NRM - taper as feasible   - Monitor O2 Sat.  - Monitor labs  - F.U CXR  - Unable to obtain VQ or CT given CKD   - DDimer 4239; CRP 15.88   - Monitor labs: ferritin, ddimer, ESR, LDH, CRP   - Prone positioning as tolerated   - Pulm F/U   - ID F/U   - Palliative F/U   - Isolation precautions   - DVT and GI PPX     2. CKD   - Creat 5.27  - Avoid nephrotoxic drugs  - Renal F/U     3. Elevated Troponin  - Tele monitoring  - Echo e  - F/U CE x 3.   - ACS protocol  - Cardio F/U     4. Gout  - Continue Allopurinol   - Not in acute exacerbation

## 2021-01-16 NOTE — PROGRESS NOTE ADULT - PROBLEM SELECTOR PLAN 1
-Patient presented respiratory distress  -COVID positive  -Desaturating on RA, also has moderate respiratory distress with increased work of breathing  -Started on decadron  -started on albuterol, Montelukast   -Monitor respiratory status  -COVID markers elevated   -NRB on 15 L  -Prone if desat

## 2021-01-16 NOTE — PROGRESS NOTE ADULT - SUBJECTIVE AND OBJECTIVE BOX
Problem List:  CKD stage 5,   COVID 19      PAST MEDICAL & SURGICAL HISTORY:  Gout    CKD (chronic kidney disease)    HLD (hyperlipidemia)    HTN (hypertension)    No significant past surgical history        No Known Allergies      MEDICATIONS  (STANDING):  ALBUTerol    90 MICROgram(s) HFA Inhaler 1 Puff(s) Inhalation every 4 hours  allopurinol 100 milliGRAM(s) Oral at bedtime  amLODIPine   Tablet 10 milliGRAM(s) Oral daily  ascorbic acid 500 milliGRAM(s) Oral daily  cholecalciferol 2000 Unit(s) Oral daily  dexAMETHasone  Injectable 6 milliGRAM(s) IV Push daily  dextrose 5% + sodium chloride 0.45% 1000 milliLiter(s) (60 mL/Hr) IV Continuous <Continuous>  epoetin jose-epbx (RETACRIT) Injectable 96503 Unit(s) SubCutaneous <User Schedule>  famotidine Injectable 20 milliGRAM(s) IV Push daily  heparin   Injectable 5000 Unit(s) SubCutaneous every 8 hours  hydrALAZINE 50 milliGRAM(s) Oral three times a day  latanoprost 0.005% Ophthalmic Solution 1 Drop(s) Both EYES at bedtime  metoprolol tartrate 50 milliGRAM(s) Oral three times a day  montelukast 10 milliGRAM(s) Oral daily  sevelamer carbonate 800 milliGRAM(s) Oral three times a day with meals  sodium bicarbonate 650 milliGRAM(s) Oral three times a day  zinc sulfate 220 milliGRAM(s) Oral daily    MEDICATIONS  (PRN):  acetaminophen   Tablet .. 650 milliGRAM(s) Oral every 6 hours PRN Temp greater or equal to 38C (100.4F)  guaiFENesin  milliGRAM(s) Oral every 12 hours PRN Cough                            9.8    6.84  )-----------( 106      ( 16 Jan 2021 06:52 )             32.0     01-16    142  |  104  |  188<H>  ----------------------------<  157<H>  4.1   |  21<L>  |  5.27<H>    Ca    8.2<L>      16 Jan 2021 06:52  Phos  7.0     01-16  Mg     1.9     01-16    TPro  6.3  /  Alb  2.2<L>  /  TBili  0.8  /  DBili  x   /  AST  18  /  ALT  22  /  AlkPhos  82  01-16        Sodium, Random Urine: 54 mmol/L (01-14 @ 14:27)  Creatinine, Random Urine: 49 mg/dL (01-14 @ 14:27)  Calcium, Random Urine: 3.8 mg/dL (01-13 @ 06:10)  Creatinine, Random Urine: 51 mg/dL (01-13 @ 00:14)  Chloride, Random Urine: 41 mmol/L (01-13 @ 00:14)  Osmolality, Random Urine: 392 mos/kg (01-13 @ 00:14)  Sodium, Random Urine: 47 mmol/L (01-13 @ 00:14)      REVIEW OF SYSTEMS:  tired         VITALS:  T(F): 97.8 (01-16-21 @ 07:35), Max: 97.9 (01-16-21 @ 05:26)  HR: 84 (01-16-21 @ 07:35)  BP: 136/65 (01-16-21 @ 07:35)  RR: 19 (01-16-21 @ 07:35)  SpO2: 95% (01-16-21 @ 07:35)  Wt(kg): --    01-15 @ 07:01  -  01-16 @ 07:00  --------------------------------------------------------  IN: 720 mL / OUT: 800 mL / NET: -80 mL        PHYSICAL EXAM:  Constitutional: appears tired on NRBM 15 liters  Neck: No JVD, no carotid bruit, supple, no adenopathy  Respiratory: Good air entrance B/L, no wheezes, rales or rhonchi  Cardiovascular: S1, S2, RRR, no pericardial rub, no murmur  Abdomen: BS+, soft, no tenderness, no bruit  Pelvis: bladder nondistended  Extremities: No cyanosis or clubbing. No peripheral edema.

## 2021-01-16 NOTE — CONSULT NOTE ADULT - PROBLEM SELECTOR RECOMMENDATION 2
-uptrending creatinine  -avoid nephrotoxins  -followed by nephrology, no urgent need for HD  -no HD, as per outpt nephrologist, per notes

## 2021-01-16 NOTE — PROGRESS NOTE ADULT - SUBJECTIVE AND OBJECTIVE BOX
CHIEF COMPLAINT:Patient is a 89y old  Male who presents with a chief complaint of respiratory distress.Pt on NR FM.    	  REVIEW OF SYSTEMS:  CONSTITUTIONAL: No fever, weight loss, or fatigue  EYES: No eye pain, visual disturbances, or discharge  ENT:  No difficulty hearing, tinnitus, vertigo; No sinus or throat pain  NECK: No pain or stiffness  RESPIRATORY: No cough, wheezing, chills or hemoptysis; + Shortness of Breath  CARDIOVASCULAR: No chest pain, palpitations, passing out, dizziness, or leg swelling  GASTROINTESTINAL: No abdominal or epigastric pain. No nausea, vomiting, or hematemesis; No diarrhea or constipation. No melena or hematochezia.  GENITOURINARY: No dysuria, frequency, hematuria, or incontinence  NEUROLOGICAL: No headaches, memory loss, loss of strength, numbness, or tremors  SKIN: No itching, burning, rashes, or lesions   LYMPH Nodes: No enlarged glands  ENDOCRINE: No heat or cold intolerance; No hair loss  MUSCULOSKELETAL: No joint pain or swelling; No muscle, back, or extremity pain  PSYCHIATRIC: No depression, anxiety, mood swings, or difficulty sleeping  HEME/LYMPH: No easy bruising, or bleeding gums  ALLERGY AND IMMUNOLOGIC: No hives or eczema	          PHYSICAL EXAM:  T(C): 36.6 (01-16-21 @ 07:35), Max: 36.6 (01-16-21 @ 05:26)  HR: 84 (01-16-21 @ 07:35) (82 - 103)  BP: 136/65 (01-16-21 @ 07:35) (112/50 - 136/65)  RR: 19 (01-16-21 @ 07:35) (19 - 20)  SpO2: 95% (01-16-21 @ 07:35) (95% - 100%)  Wt(kg): --  I&O's Summary    15 Allan 2021 07:01  -  16 Jan 2021 07:00  --------------------------------------------------------  IN: 720 mL / OUT: 800 mL / NET: -80 mL        Appearance: Normal	  HEENT:   Normal oral mucosa, PERRL, EOMI	  Lymphatic: No lymphadenopathy  Cardiovascular: Normal S1 S2, No JVD, No murmurs, No edema  Respiratory: Dec BS at bases	  Psychiatry: A & O x 3, Mood & affect appropriate  Gastrointestinal:  Soft, Non-tender, + BS	  Skin: No rashes, No ecchymoses, No cyanosis	  Neurologic: Non-focal  Extremities: Normal range of motion, No clubbing, cyanosis or edema  Vascular: Peripheral pulses palpable 2+ bilaterally    MEDICATIONS  (STANDING):  ALBUTerol    90 MICROgram(s) HFA Inhaler 1 Puff(s) Inhalation every 4 hours  allopurinol 100 milliGRAM(s) Oral at bedtime  amLODIPine   Tablet 10 milliGRAM(s) Oral daily  ascorbic acid 500 milliGRAM(s) Oral daily  cholecalciferol 2000 Unit(s) Oral daily  dexAMETHasone  Injectable 6 milliGRAM(s) IV Push daily  dextrose 5% + sodium chloride 0.45% 1000 milliLiter(s) (60 mL/Hr) IV Continuous <Continuous>  epoetin jose-epbx (RETACRIT) Injectable 93224 Unit(s) SubCutaneous <User Schedule>  famotidine Injectable 20 milliGRAM(s) IV Push daily  heparin   Injectable 5000 Unit(s) SubCutaneous every 8 hours  hydrALAZINE 50 milliGRAM(s) Oral three times a day  latanoprost 0.005% Ophthalmic Solution 1 Drop(s) Both EYES at bedtime  metoprolol tartrate 50 milliGRAM(s) Oral three times a day  montelukast 10 milliGRAM(s) Oral daily  sevelamer carbonate 800 milliGRAM(s) Oral three times a day with meals  sodium bicarbonate 650 milliGRAM(s) Oral three times a day  zinc sulfate 220 milliGRAM(s) Oral daily    	  	  LABS:	 	                       9.8    6.84  )-----------( 106      ( 16 Jan 2021 06:52 )             32.0     01-16    142  |  104  |  188<H>  ----------------------------<  157<H>  4.1   |  21<L>  |  5.27<H>    Ca    8.2<L>      16 Jan 2021 06:52  Phos  7.0     01-16  Mg     1.9     01-16    TPro  6.3  /  Alb  2.2<L>  /  TBili  0.8  /  DBili  x   /  AST  18  /  ALT  22  /  AlkPhos  82  01-16    proBNP: Serum Pro-Brain Natriuretic Peptide: 9057 pg/mL (01-12 @ 16:37)    Lipid Profile: Cholesterol 104  LDL --  HDL 41      HgA1c:   TSH: Thyroid Stimulating Hormone, Serum: 0.57 uU/mL (01-13 @ 07:25)

## 2021-01-16 NOTE — PROGRESS NOTE ADULT - PROBLEM SELECTOR PLAN 4
-Has h/o ckd stage 5 , not a candidate for HD due to his age as per his nephrologist OP   -unknown baseline  -Not starting remdesivir  Avoid nephrotoxins  Pt with KARIN on CKD. Appears dry  -Starts on fluids D5 and 1/2 NS with bicarb  Nephrology Dr Randall -Has h/o ckd stage 5 , not a candidate for HD due to his age as per his nephrologist OP   -unknown baseline  -Not starting remdesivir  Avoid nephrotoxins  Pt with KARIN on CKD. Appears dry  -Starts on fluids D5 and 1/2 NS with bicarb  -Pt has OP nephrologist. Family is aware that pt is not a good candidate for HD and Son erendira wants to stick to his OP nephrologist decision and wants to make him comfortable on CKD medications like oral bicarb etc   Nephrology Dr Randall

## 2021-01-16 NOTE — PROGRESS NOTE ADULT - ASSESSMENT
Patient is a 89y old  Male with medical history significant for HTN, HLD, CKD, and Gout, now presents to the ER for evaluation of worsening SOB . Pt was recently discharged from [facility] on 1/5/21 where he tested positive for COVID-19. He was d/c on Dexamethasone and Eliquis. He states that he was having increasing difficulty breathing, even on minimal ambulation. Also started having bleed from nose and gums. He has no fever, chest pain or any other acute complaints. On admission, he has no fever but hypoxic, hence ICU was consulted since patient is on NRB 15 L, initially. Patient was given dose of decadron, and his respiratory status improved, saturating 99% on NRB. So patient was downgraded to medicine.  Now the ID consult requested to assist with further evaluation and  management.     # COVID pneumonia with hypoxia- on supplemental oxygenation  # Positive urine CX- in the setting of negative UA, will repeat UA    would recommend:    1. Please obtain repeat UA  2. Continue Supplemental oxygenation and bronchodilator as needed  3. Aspiration precaution  4. Continue Dexamethasone and hold Remdesivir due to kidney insufficiency  5. COVID precaution  6. Continue supportive care    d/w Nursing staff     Attending Attestation:    Spent more than 45 minutes on total encounter, more than 50 % of the visit was spent counseling and/or coordinating care by the Attending physician.

## 2021-01-16 NOTE — PROGRESS NOTE ADULT - PROBLEM SELECTOR PLAN 3
-Has elevated troponin on admission  -No chest pain at present  -Likely demand ischemia  Also has elevated troponin  -Pro BNP is also elevated  -Likely patient has CHF  -f/u echo  Trend trop  -Monitor on tele  -on statin and bb  -f/u echo

## 2021-01-16 NOTE — CHART NOTE - NSCHARTNOTEFT_GEN_A_CORE
Writer was paged by RN that pt is desaturating to 80% on NRB. Pt was assessed at bedside, Pt was placed on NC 8L + NRB 15L , O2 saturation went up to 88%. Dr Soares spoke to the son Rebel , he doesn't want any intubation or CPR so pt was made DNR/DNI. Son doesn't want his father to suffer more and wants to take him home tomorrow and wants him to die at home. Writer also spoke to him, he insisted he wants his father to be comfortable and doesn't want him to die at hospital. He was explained that if patient's O2 saturation goes down more , he needs to be placed in ICU on HFNC but son keeps on insisting that he will take him home tomorrow regardless . Spoke to Attending Dr Marquez , Will monitor the pt on Floor for now on NRB + HF with Goal O2 saturation around 88% . Will update the son if Pt worsens overnight regarding further wishes

## 2021-01-16 NOTE — PROGRESS NOTE ADULT - SUBJECTIVE AND OBJECTIVE BOX
Patient is a 89y old  Male who presents with a chief complaint of respiratory distress (16 Jan 2021 11:13)  Patient is awake, alert, laying in bed in NAD. Still on NRBM because of hypoxemia.    INTERVAL HPI/OVERNIGHT EVENTS:      VITAL SIGNS:  T(F): 97.6 (01-16-21 @ 11:54)  HR: 80 (01-16-21 @ 11:54)  BP: 126/64 (01-16-21 @ 11:54)  RR: 19 (01-16-21 @ 11:54)  SpO2: 97% (01-16-21 @ 11:54)  Wt(kg): --  I&O's Detail    15 Allan 2021 07:01  -  16 Jan 2021 07:00  --------------------------------------------------------  IN:    dextrose 5% + sodium chloride 0.45% w/ Additives: 720 mL  Total IN: 720 mL    OUT:    Voided (mL): 800 mL  Total OUT: 800 mL    Total NET: -80 mL              REVIEW OF SYSTEMS:    CONSTITUTIONAL:  No fevers, chills, sweats    HEENT:  Eyes:  No diplopia or blurred vision. ENT:  No earache, sore throat or runny nose.    CARDIOVASCULAR:  No pressure, squeezing, tightness, or heaviness about the chest; no palpitations.    RESPIRATORY:  Per HPI    GASTROINTESTINAL:  No abdominal pain, nausea, vomiting or diarrhea.    GENITOURINARY:  No dysuria, frequency or urgency.    NEUROLOGIC:  No paresthesias, fasciculations, seizures or weakness.    PSYCHIATRIC:  No disorder of thought or mood.      PHYSICAL EXAM:    Constitutional: Well developed and nourished  Eyes:Perrla  ENMT: normal  Neck:supple  Respiratory: good air entry  Cardiovascular: S1 S2 regular  Gastrointestinal: Soft, Non tender  Extremities: No edema  Vascular:normal  Neurological:Awake, alert,Ox3  Musculoskeletal:Normal      MEDICATIONS  (STANDING):  ALBUTerol    90 MICROgram(s) HFA Inhaler 1 Puff(s) Inhalation every 4 hours  allopurinol 100 milliGRAM(s) Oral at bedtime  amLODIPine   Tablet 10 milliGRAM(s) Oral daily  ascorbic acid 500 milliGRAM(s) Oral daily  cholecalciferol 2000 Unit(s) Oral daily  dexAMETHasone  Injectable 6 milliGRAM(s) IV Push daily  dextrose 5% + sodium chloride 0.45% 1000 milliLiter(s) (60 mL/Hr) IV Continuous <Continuous>  epoetin jose-epbx (RETACRIT) Injectable 72104 Unit(s) SubCutaneous <User Schedule>  famotidine Injectable 20 milliGRAM(s) IV Push daily  heparin   Injectable 5000 Unit(s) SubCutaneous every 8 hours  hydrALAZINE 50 milliGRAM(s) Oral three times a day  latanoprost 0.005% Ophthalmic Solution 1 Drop(s) Both EYES at bedtime  metoprolol tartrate 50 milliGRAM(s) Oral three times a day  montelukast 10 milliGRAM(s) Oral daily  sevelamer carbonate 800 milliGRAM(s) Oral three times a day with meals  sodium bicarbonate 650 milliGRAM(s) Oral three times a day  zinc sulfate 220 milliGRAM(s) Oral daily    MEDICATIONS  (PRN):  acetaminophen   Tablet .. 650 milliGRAM(s) Oral every 6 hours PRN Temp greater or equal to 38C (100.4F)  guaiFENesin  milliGRAM(s) Oral every 12 hours PRN Cough      Allergies    No Known Allergies    Intolerances    SARS-CoV-2: Detected: This Respiratory Panel uses polymerase chain reaction (PCR) to detect for   adenovirus; coronavirus (HKU1, NL63, 229E, OC43); human metapneumovirus   (hMPV); human enterovirus/rhinovirus (Entero/RV); influenza A; influenza   A/H1; influenza A/H3; influenza A/H1-2009; influenza B; parainfluenza   viruses 1, 2, 3, 4; respiratory syncytial virus; Mycoplasma pneumoniae;   Chlamydophila pneumoniae; and SARS-CoV-2. (01.12.21 @ 16:39)     LABS:                        9.8    6.84  )-----------( 106      ( 16 Jan 2021 06:52 )             32.0     01-16    142  |  104  |  188<H>  ----------------------------<  157<H>  4.1   |  21<L>  |  5.27<H>    Ca    8.2<L>      16 Jan 2021 06:52  Phos  7.0     01-16  Mg     1.9     01-16    TPro  6.3  /  Alb  2.2<L>  /  TBili  0.8  /  DBili  x   /  AST  18  /  ALT  22  /  AlkPhos  82  01-16              CAPILLARY BLOOD GLUCOSE        pro-bnp -- 01-15 @ 07:02     d-dimer 4239  01-15 @ 07:02  pro-bnp 9057 01-12 @ 16:37     d-dimer --  01-12 @ 16:37      RADIOLOGY & ADDITIONAL TESTS:    CXR:    Ct scan chest:    ekg;    echo:

## 2021-01-16 NOTE — PROGRESS NOTE ADULT - SUBJECTIVE AND OBJECTIVE BOX
PGY-1 Progress Note discussed with attending    PAGER #: [[phone]] TILL 5:00 PM  PLEASE CONTACT ON CALL TEAM:  - On Call Team (Please refer to Marcus) FROM 5:00 PM - 8:30PM  - Nightfloat Team FROM 8:30 -7:30 AM    CHIEF COMPLAINT & BRIEF HOSPITAL COURSE:    INTERVAL HPI/OVERNIGHT EVENTS:       REVIEW OF SYSTEMS:  CONSTITUTIONAL: No fever, weight loss, or fatigue  RESPIRATORY: No cough, wheezing, chills or hemoptysis; No shortness of breath  CARDIOVASCULAR: No chest pain, palpitations, dizziness, or leg swelling  GASTROINTESTINAL: No abdominal pain. No nausea, vomiting, or hematemesis; No diarrhea or constipation. No melena or hematochezia.  GENITOURINARY: No dysuria or hematuria, urinary frequency  NEUROLOGICAL: No headaches, memory loss, loss of strength, numbness, or tremors  SKIN: No itching, burning, rashes, or lesions     Vital Signs Last 24 Hrs  T(C): 36.6 (16 Jan 2021 07:35), Max: 36.6 (16 Jan 2021 05:26)  T(F): 97.8 (16 Jan 2021 07:35), Max: 97.9 (16 Jan 2021 05:26)  HR: 84 (16 Jan 2021 07:35) (82 - 103)  BP: 136/65 (16 Jan 2021 07:35) (112/50 - 136/65)  BP(mean): --  RR: 19 (16 Jan 2021 07:35) (19 - 20)  SpO2: 95% (16 Jan 2021 07:35) (95% - 100%)    PHYSICAL EXAMINATION:  GENERAL: NAD, well built  HEAD:  Atraumatic, Normocephalic  EYES:  conjunctiva and sclera clear  NECK: Supple, No JVD, Normal thyroid  CHEST/LUNG: Clear to auscultation. Clear to percussion bilaterally; No rales, rhonchi, wheezing, or rubs  HEART: Regular rate and rhythm; No murmurs, rubs, or gallops  ABDOMEN: Soft, Nontender, Nondistended; Bowel sounds present  NERVOUS SYSTEM:  Alert & Oriented X3,    EXTREMITIES:  2+ Peripheral Pulses, No clubbing, cyanosis, or edema  SKIN: warm dry                          9.8    6.84  )-----------( 106      ( 16 Jan 2021 06:52 )             32.0     01-16    142  |  104  |  188<H>  ----------------------------<  157<H>  4.1   |  21<L>  |  5.27<H>    Ca    8.2<L>      16 Jan 2021 06:52  Phos  7.0     01-16  Mg     1.9     01-16    TPro  6.3  /  Alb  2.2<L>  /  TBili  0.8  /  DBili  x   /  AST  18  /  ALT  22  /  AlkPhos  82  01-16    LIVER FUNCTIONS - ( 16 Jan 2021 06:52 )  Alb: 2.2 g/dL / Pro: 6.3 g/dL / ALK PHOS: 82 U/L / ALT: 22 U/L DA / AST: 18 U/L / GGT: x                   CAPILLARY BLOOD GLUCOSE      RADIOLOGY & ADDITIONAL TESTS:                   PGY-1 Progress Note discussed with attending    PAGER #: [[phone]] TILL 5:00 PM  PLEASE CONTACT ON CALL TEAM:  - On Call Team (Please refer to Marcus) FROM 5:00 PM - 8:30PM  - Nightfloat Team FROM 8:30 -7:30 AM    CHIEF COMPLAINT & BRIEF HOSPITAL COURSE: Admitted for COVID pneumonia     INTERVAL HPI/OVERNIGHT EVENTS: No active issue overnight.       REVIEW OF SYSTEMS:  CONSTITUTIONAL: No fever, weight loss, or fatigue  RESPIRATORY: No cough, wheezing, chills or hemoptysis; No shortness of breath  CARDIOVASCULAR: No chest pain, palpitations, dizziness, or leg swelling  GASTROINTESTINAL: No abdominal pain. No nausea, vomiting, or hematemesis; No diarrhea or constipation. No melena or hematochezia.  GENITOURINARY: No dysuria or hematuria, urinary frequency  NEUROLOGICAL: No headaches, memory loss, loss of strength, numbness, or tremors  SKIN: No itching, burning, rashes, or lesions     Vital Signs Last 24 Hrs  T(C): 36.6 (16 Jan 2021 07:35), Max: 36.6 (16 Jan 2021 05:26)  T(F): 97.8 (16 Jan 2021 07:35), Max: 97.9 (16 Jan 2021 05:26)  HR: 84 (16 Jan 2021 07:35) (82 - 103)  BP: 136/65 (16 Jan 2021 07:35) (112/50 - 136/65)  BP(mean): --  RR: 19 (16 Jan 2021 07:35) (19 - 20)  SpO2: 95% (16 Jan 2021 07:35) (95% - 100%)    PHYSICAL EXAMINATION:  GENERAL: NAD, on NRB 15 l  HEAD:  Atraumatic, Normocephalic  EYES:  conjunctiva and sclera clear  NECK: Supple, No JVD, Normal thyroid  CHEST/LUNG: Clear to auscultation. Clear to percussion bilaterally; No rales, rhonchi, wheezing, or rubs  HEART: Regular rate and rhythm; No murmurs, rubs, or gallops  ABDOMEN: Soft, Nontender, Nondistended; Bowel sounds present  NERVOUS SYSTEM:  Alert & Oriented X3,    EXTREMITIES:  2+ Peripheral Pulses, No clubbing, cyanosis, or edema  SKIN: warm dry                          9.8    6.84  )-----------( 106      ( 16 Jan 2021 06:52 )             32.0     01-16    142  |  104  |  188<H>  ----------------------------<  157<H>  4.1   |  21<L>  |  5.27<H>    Ca    8.2<L>      16 Jan 2021 06:52  Phos  7.0     01-16  Mg     1.9     01-16    TPro  6.3  /  Alb  2.2<L>  /  TBili  0.8  /  DBili  x   /  AST  18  /  ALT  22  /  AlkPhos  82  01-16    LIVER FUNCTIONS - ( 16 Jan 2021 06:52 )  Alb: 2.2 g/dL / Pro: 6.3 g/dL / ALK PHOS: 82 U/L / ALT: 22 U/L DA / AST: 18 U/L / GGT: x                   CAPILLARY BLOOD GLUCOSE      RADIOLOGY & ADDITIONAL TESTS:

## 2021-01-16 NOTE — CONSULT NOTE ADULT - SUBJECTIVE AND OBJECTIVE BOX
HPI:  89 year old [place] male with medical history significant for HTN, HLD, CKD, and Gout and no significant surgical history presents to ED c/o worsening SOB since this morning. Pt was recently discharged from [facility] on 1/5/21 where he tested positive for covid-19. Pt was d/c on Dexamethasone and Eliquis. He states that he was having increasing difficulty breathing, even on minimal ambulation. Also started having bleed from nose and gums. States that it started today, and is mild amount of blood loss. Denies fevers, nausea, emesis, chest pain, abdominal pain, dysuria, hematuria, diarrhea, constipation, or any other acute complaints. NKDA .     ED: ICU was consulted as pt was hypoxic and on NRB 15 L, initially. Patient was given dose of decadron. His respiratory status improved, saturating 99% on NRB. So patient was downgraded to medicine. (12 Jan 2021 20:23)    89 yo male with a history of HTN, HLD, CKD, and Gout and no significant surgical history, who recently tested positive for COVID at [facility] and d/gamaliel home on dexamethasone and Eliquis,  presents to [facility] with worsening shortness of breath since morning of presentation. On presentation, he reported increased difficulty breathing, even with minimal ambulation, epistaxis and bleeding of gums.  Denied fevers, nausea, emesis, chest pain, abdominal pain, dysuria, hematuria, diarrhea, constipation, or any other acute complaints on presentation.  In the ED, ICU was consulted, as patient was hypoxic on 15 liters, moved to NRB, then sats became 97%. CXR in ED revealed bilateral lung parenchymal infiltrates identified with appearance suggestive for atypical viral pneumonia/Covid 19.  + Troponins, BNP elevated (9057).    Patient is on decadron, albuterol, montelukast, requires supplemental oxygen, on NRB.  Patient is followed by cardiology, received lasix, on lopressor, hydralazine, amlodipine.  Followed by nephrology, KARIN on CKD (worsening), not a candidate for dialysis, as per notes, per outpatient nephrologist.  For goals of care, support.              PAST MEDICAL & SURGICAL HISTORY:  Gout    CKD (chronic kidney disease)    HLD (hyperlipidemia)    HTN (hypertension)    No significant past surgical history        SOCIAL HISTORY:    Admitted from:  home assisted living [facility]   Substance abuse history:              Tobacco hx:                  Alcohol hx:              Home Opioid hx:  Mormonism:                                    Preferred Language:    Surrogate/HCP/Guardian:            Phone#:    FAMILY HISTORY:    Baseline ADLs (prior to admission):    Allergies    No Known Allergies    Intolerances      Present Symptoms:   Dyspnea:   Nausea/Vomiting:   Anxiety:  Depressed   Fatigue:  Loss of appetite:   Pain:                                location:          Review of Systems: [All others negative or Unable to obtain due to poor mentation]    MEDICATIONS  (STANDING):  ALBUTerol    90 MICROgram(s) HFA Inhaler 1 Puff(s) Inhalation every 4 hours  allopurinol 100 milliGRAM(s) Oral at bedtime  amLODIPine   Tablet 10 milliGRAM(s) Oral daily  ascorbic acid 500 milliGRAM(s) Oral daily  cholecalciferol 2000 Unit(s) Oral daily  dexAMETHasone  Injectable 6 milliGRAM(s) IV Push daily  dextrose 5% + sodium chloride 0.45% 1000 milliLiter(s) (60 mL/Hr) IV Continuous <Continuous>  epoetin jose-epbx (RETACRIT) Injectable 68539 Unit(s) SubCutaneous <User Schedule>  famotidine Injectable 20 milliGRAM(s) IV Push daily  heparin   Injectable 5000 Unit(s) SubCutaneous every 8 hours  hydrALAZINE 50 milliGRAM(s) Oral three times a day  latanoprost 0.005% Ophthalmic Solution 1 Drop(s) Both EYES at bedtime  metoprolol tartrate 50 milliGRAM(s) Oral three times a day  montelukast 10 milliGRAM(s) Oral daily  sevelamer carbonate 800 milliGRAM(s) Oral three times a day with meals  sodium bicarbonate 650 milliGRAM(s) Oral three times a day  zinc sulfate 220 milliGRAM(s) Oral daily    MEDICATIONS  (PRN):  acetaminophen   Tablet .. 650 milliGRAM(s) Oral every 6 hours PRN Temp greater or equal to 38C (100.4F)  guaiFENesin  milliGRAM(s) Oral every 12 hours PRN Cough      PHYSICAL EXAM:    Vital Signs Last 24 Hrs  T(C): 36.6 (16 Jan 2021 07:35), Max: 36.6 (16 Jan 2021 05:26)  T(F): 97.8 (16 Jan 2021 07:35), Max: 97.9 (16 Jan 2021 05:26)  HR: 84 (16 Jan 2021 07:35) (82 - 103)  BP: 136/65 (16 Jan 2021 07:35) (112/50 - 136/65)  BP(mean): --  RR: 19 (16 Jan 2021 07:35) (19 - 20)  SpO2: 95% (16 Jan 2021 07:35) (95% - 100%)    General: alert  oriented x ____    [lethargic distressed cachexia  nonverbal  unarousable verbal]  Karnofsky Performance Score/Palliative Performance Status Version2:     %    HEENT: normal  dry mouth  ET tube/trach oral lesions:  Lungs: comfortable tachypnea/labored breathing  excessive secretions  CV: normal  tachycardia  GI: normal  distended  tender  incontinent               PEG/NG/OG tube  constipation  last BM:   : normal  incontinent  oliguria/anuria  fay  Musculoskeletal: normal  weakness  edema             ambulatory  bedbound/wheelchair bound  Skin: normal  pressure ulcers: stage: edema: other:  Neuro: no deficits cognitive impairment dsyphagia/dysarthria paresis: other:  Oral intake ability: unable/only mouth care [minimal moderate full capability]  Diet: [NPO]    LABS:                        9.8    6.84  )-----------( 106      ( 16 Jan 2021 06:52 )             32.0     01-16    142  |  104  |  188<H>  ----------------------------<  157<H>  4.1   |  21<L>  |  5.27<H>    Ca    8.2<L>      16 Jan 2021 06:52  Phos  7.0     01-16  Mg     1.9     01-16    TPro  6.3  /  Alb  2.2<L>  /  TBili  0.8  /  DBili  x   /  AST  18  /  ALT  22  /  AlkPhos  82  01-16        RADIOLOGY & ADDITIONAL STUDIES:    ADVANCE DIRECTIVES:   Advanced Care Planning discussion total time spent:               HPI:  89 year old German male with medical history significant for HTN, HLD, CKD, and Gout and no significant surgical history presents to ED c/o worsening SOB since this morning. Pt was recently discharged from [facility] on 1/5/21 where he tested positive for covid-19. Pt was d/c on Dexamethasone and Eliquis. He states that he was having increasing difficulty breathing, even on minimal ambulation. Also started having bleed from nose and gums. States that it started today, and is mild amount of blood loss. Denies fevers, nausea, emesis, chest pain, abdominal pain, dysuria, hematuria, diarrhea, constipation, or any other acute complaints. NKDA .     ED: ICU was consulted as pt was hypoxic and on NRB 15 L, initially. Patient was given dose of decadron. His respiratory status improved, saturating 99% on NRB. So patient was downgraded to medicine. (12 Jan 2021 20:23)    89 yo male with a history of HTN, HLD, CKD, and Gout and no significant surgical history, who recently tested positive for COVID at [facility] and d/gamaliel home on dexamethasone and Eliquis,  presents to [facility] with worsening shortness of breath since morning of presentation. On presentation, he reported increased difficulty breathing, even with minimal ambulation, epistaxis and bleeding of gums.  Denied fevers, nausea, emesis, chest pain, abdominal pain, dysuria, hematuria, diarrhea, constipation, or any other acute complaints on presentation.  In the ED, ICU was consulted, as patient was hypoxic on 15 liters, moved to NRB, then sats became 97%. CXR in ED revealed bilateral lung parenchymal infiltrates identified with appearance suggestive for atypical viral pneumonia/Covid 19.  + Troponins, BNP elevated (9057).    Patient is on decadron, albuterol, montelukast, requires supplemental oxygen, on NRB.  Patient is followed by cardiology, received lasix, on lopressor, hydralazine, amlodipine.  Followed by nephrology, KARIN on CKD (worsening), not a candidate for dialysis, as per notes, per outpatient nephrologist.  For goals of care, support.      Limited information from patient.  Spoke to patient in both English and with  [phone].  Affirms that he is ok.  Denies pain.  Unable to hear answers to additional questions, on non-rebreather, in negative pressure room.  Also attempted to call patient with  via cell phone,as provided by him: [phone], tera rai.    Per RN, patient has not complained of pain, shortness of breaht, or other distressing symptoms.  Per RN, patient is not eating.            PAST MEDICAL & SURGICAL HISTORY:  Gout    CKD (chronic kidney disease)    HLD (hyperlipidemia)    HTN (hypertension)    No significant past surgical history        SOCIAL HISTORY:  , has a son.  Admitted from:  home   Substance abuse history:              Tobacco hx:                  Alcohol hx:              Home Opioid hx:  Mormon:                                    Preferred Language:    Surrogate/HCP/Guardian:            Phone#:    FAMILY HISTORY:    Baseline ADLs (prior to admission):    Allergies    No Known Allergies    Intolerances    Review of Systems: As above    MEDICATIONS  (STANDING):  ALBUTerol    90 MICROgram(s) HFA Inhaler 1 Puff(s) Inhalation every 4 hours  allopurinol 100 milliGRAM(s) Oral at bedtime  amLODIPine   Tablet 10 milliGRAM(s) Oral daily  ascorbic acid 500 milliGRAM(s) Oral daily  cholecalciferol 2000 Unit(s) Oral daily  dexAMETHasone  Injectable 6 milliGRAM(s) IV Push daily  dextrose 5% + sodium chloride 0.45% 1000 milliLiter(s) (60 mL/Hr) IV Continuous <Continuous>  epoetin jose-epbx (RETACRIT) Injectable 50006 Unit(s) SubCutaneous <User Schedule>  famotidine Injectable 20 milliGRAM(s) IV Push daily  heparin   Injectable 5000 Unit(s) SubCutaneous every 8 hours  hydrALAZINE 50 milliGRAM(s) Oral three times a day  latanoprost 0.005% Ophthalmic Solution 1 Drop(s) Both EYES at bedtime  metoprolol tartrate 50 milliGRAM(s) Oral three times a day  montelukast 10 milliGRAM(s) Oral daily  sevelamer carbonate 800 milliGRAM(s) Oral three times a day with meals  sodium bicarbonate 650 milliGRAM(s) Oral three times a day  zinc sulfate 220 milliGRAM(s) Oral daily    MEDICATIONS  (PRN):  acetaminophen   Tablet .. 650 milliGRAM(s) Oral every 6 hours PRN Temp greater or equal to 38C (100.4F)  guaiFENesin  milliGRAM(s) Oral every 12 hours PRN Cough      PHYSICAL EXAM:    Vital Signs Last 24 Hrs  T(C): 36.6 (16 Jan 2021 07:35), Max: 36.6 (16 Jan 2021 05:26)  T(F): 97.8 (16 Jan 2021 07:35), Max: 97.9 (16 Jan 2021 05:26)  HR: 84 (16 Jan 2021 07:35) (82 - 103)  BP: 136/65 (16 Jan 2021 07:35) (112/50 - 136/65)  BP(mean): --  RR: 19 (16 Jan 2021 07:35) (19 - 20)  SpO2: 95% (16 Jan 2021 07:35) (95% - 100%)    karnofsky:  Gen: In NAD.  No pain behaviors or work of breathing noted  Neuro: awake, communicates simple responses.  Head: NC/AT  Eyes: sclerae non-icteric  ENT: NRB  Resp: unlabored  : no fay  Peripheral Vasc: warm, no edema  Int: warm and dry  Psych: no psychomotor agitation      LABS:                        9.8    6.84  )-----------( 106      ( 16 Jan 2021 06:52 )             32.0     01-16    142  |  104  |  188<H>  ----------------------------<  157<H>  4.1   |  21<L>  |  5.27<H>    Ca    8.2<L>      16 Jan 2021 06:52  Phos  7.0     01-16  Mg     1.9     01-16    TPro  6.3  /  Alb  2.2<L>  /  TBili  0.8  /  DBili  x   /  AST  18  /  ALT  22  /  AlkPhos  82  01-16        RADIOLOGY & ADDITIONAL STUDIES: reviewed    ADVANCE DIRECTIVES:   Advanced Care Planning discussion total time spent:               HPI:  89 year old English male with medical history significant for HTN, HLD, CKD, and Gout and no significant surgical history presents to ED c/o worsening SOB since this morning. Pt was recently discharged from [facility] on 1/5/21 where he tested positive for covid-19. Pt was d/c on Dexamethasone and Eliquis. He states that he was having increasing difficulty breathing, even on minimal ambulation. Also started having bleed from nose and gums. States that it started today, and is mild amount of blood loss. Denies fevers, nausea, emesis, chest pain, abdominal pain, dysuria, hematuria, diarrhea, constipation, or any other acute complaints. NKDA .     ED: ICU was consulted as pt was hypoxic and on NRB 15 L, initially. Patient was given dose of decadron. His respiratory status improved, saturating 99% on NRB. So patient was downgraded to medicine. (12 Jan 2021 20:23)    89 yo male with a history of HTN, HLD, CKD, and gout and no significant surgical history, who recently tested positive for COVID at [facility] and d/gamaliel home on dexamethasone and Eliquis, presents to [facility] with worsening shortness of breath since morning of presentation. On presentation, he reported increased difficulty breathing, even with minimal ambulation, epistaxis and bleeding of gums.  Denied fevers, nausea, emesis, chest pain, abdominal pain, dysuria, hematuria, diarrhea, constipation, or any other acute complaints on presentation.  In the ED, ICU was consulted, as patient was hypoxic on 15 liters, moved to NRB, then sats became 97%. CXR in ED revealed bilateral lung parenchymal infiltrates identified with appearance suggestive for atypical viral pneumonia/Covid 19.  + Troponins, BNP elevated (9057).    Patient is on decadron, albuterol, montelukast, requires supplemental oxygen, on NRB.  Patient is followed by cardiology, received lasix, on lopressor, hydralazine, amlodipine.  Followed by nephrology, KARIN on CKD (worsening), not a candidate for dialysis, as per notes, per outpatient nephrologist.  For goals of care, support.      Limited information from patient.  Spoke to patient in both English and with  [phone].  Affirms that he is ok.  Denies pain.  Unable to hear answers to additional questions, on non-rebreather, in negative pressure room.  Also attempted to call patient with  via cell phone,as provided by him: [phone], tera rai.    Per RN, patient has not complained of pain, shortness of breaht, or other distressing symptoms.  Per RN, patient is not eating.            PAST MEDICAL & SURGICAL HISTORY:  Gout    CKD (chronic kidney disease)    HLD (hyperlipidemia)    HTN (hypertension)    No significant past surgical history        SOCIAL HISTORY:  , has a son.  Admitted from:  home   Substance abuse history:              Tobacco hx:                  Alcohol hx:              Home Opioid hx:  Confucianist:                                    Preferred Language:    Surrogate/HCP/Guardian:            Phone#:    FAMILY HISTORY:    Baseline ADLs (prior to admission):    Allergies    No Known Allergies    Intolerances    Review of Systems: As above    MEDICATIONS  (STANDING):  ALBUTerol    90 MICROgram(s) HFA Inhaler 1 Puff(s) Inhalation every 4 hours  allopurinol 100 milliGRAM(s) Oral at bedtime  amLODIPine   Tablet 10 milliGRAM(s) Oral daily  ascorbic acid 500 milliGRAM(s) Oral daily  cholecalciferol 2000 Unit(s) Oral daily  dexAMETHasone  Injectable 6 milliGRAM(s) IV Push daily  dextrose 5% + sodium chloride 0.45% 1000 milliLiter(s) (60 mL/Hr) IV Continuous <Continuous>  epoetin jose-epbx (RETACRIT) Injectable 09242 Unit(s) SubCutaneous <User Schedule>  famotidine Injectable 20 milliGRAM(s) IV Push daily  heparin   Injectable 5000 Unit(s) SubCutaneous every 8 hours  hydrALAZINE 50 milliGRAM(s) Oral three times a day  latanoprost 0.005% Ophthalmic Solution 1 Drop(s) Both EYES at bedtime  metoprolol tartrate 50 milliGRAM(s) Oral three times a day  montelukast 10 milliGRAM(s) Oral daily  sevelamer carbonate 800 milliGRAM(s) Oral three times a day with meals  sodium bicarbonate 650 milliGRAM(s) Oral three times a day  zinc sulfate 220 milliGRAM(s) Oral daily    MEDICATIONS  (PRN):  acetaminophen   Tablet .. 650 milliGRAM(s) Oral every 6 hours PRN Temp greater or equal to 38C (100.4F)  guaiFENesin  milliGRAM(s) Oral every 12 hours PRN Cough      PHYSICAL EXAM:    Vital Signs Last 24 Hrs  T(C): 36.6 (16 Jan 2021 07:35), Max: 36.6 (16 Jan 2021 05:26)  T(F): 97.8 (16 Jan 2021 07:35), Max: 97.9 (16 Jan 2021 05:26)  HR: 84 (16 Jan 2021 07:35) (82 - 103)  BP: 136/65 (16 Jan 2021 07:35) (112/50 - 136/65)  BP(mean): --  RR: 19 (16 Jan 2021 07:35) (19 - 20)  SpO2: 95% (16 Jan 2021 07:35) (95% - 100%)    karnofsky:  Gen: In NAD.  No pain behaviors or work of breathing noted  Neuro: awake, communicates simple responses.  Head: NC/AT  Eyes: sclerae non-icteric  ENT: NRB  Resp: unlabored  : no fay  Peripheral Vasc: warm, no edema  Int: warm and dry  Psych: no psychomotor agitation      LABS:                        9.8    6.84  )-----------( 106      ( 16 Jan 2021 06:52 )             32.0     01-16    142  |  104  |  188<H>  ----------------------------<  157<H>  4.1   |  21<L>  |  5.27<H>    Ca    8.2<L>      16 Jan 2021 06:52  Phos  7.0     01-16  Mg     1.9     01-16    TPro  6.3  /  Alb  2.2<L>  /  TBili  0.8  /  DBili  x   /  AST  18  /  ALT  22  /  AlkPhos  82  01-16        RADIOLOGY & ADDITIONAL STUDIES: reviewed    ADVANCE DIRECTIVES:   Advanced Care Planning discussion total time spent:               HPI:  89 year old Japanese male with medical history significant for HTN, HLD, CKD, and Gout and no significant surgical history presents to ED c/o worsening SOB since this morning. Pt was recently discharged from [facility] on 1/5/21 where he tested positive for covid-19. Pt was d/c on Dexamethasone and Eliquis. He states that he was having increasing difficulty breathing, even on minimal ambulation. Also started having bleed from nose and gums. States that it started today, and is mild amount of blood loss. Denies fevers, nausea, emesis, chest pain, abdominal pain, dysuria, hematuria, diarrhea, constipation, or any other acute complaints. NKDA .     ED: ICU was consulted as pt was hypoxic and on NRB 15 L, initially. Patient was given dose of decadron. His respiratory status improved, saturating 99% on NRB. So patient was downgraded to medicine. (12 Jan 2021 20:23)    89 yo male with a history of HTN, HLD, CKD, and gout and no significant surgical history, who recently tested positive for COVID at [facility] and d/gamaliel home on dexamethasone and Eliquis, presents to [facility] with worsening shortness of breath since morning of presentation. On presentation, he reported increased difficulty breathing, even with minimal ambulation, epistaxis and bleeding of gums.  Denied fevers, nausea, emesis, chest pain, abdominal pain, dysuria, hematuria, diarrhea, constipation, or any other acute complaints on presentation.  In the ED, ICU was consulted, as patient was hypoxic on 15 liters, moved to NRB, then sats became 97%. CXR in ED revealed bilateral lung parenchymal infiltrates identified with appearance suggestive for atypical viral pneumonia/Covid 19.  + Troponins, BNP elevated (9057).    Patient is on decadron, albuterol, montelukast, requires supplemental oxygen, on NRB.  Patient is followed by cardiology, received lasix, on lopressor, hydralazine, amlodipine.  Followed by nephrology, KARIN on CKD (worsening), not a candidate for dialysis, as per notes, per outpatient nephrologist.  For goals of care, support.    Limited information from patient.  Spoke to patient in both English and with  [phone]; patient states that he speaks Montenegrin.  Affirms that he is ok.  Denies pain.  Unable to hear answers to additional questions, on non-rebreather, in negative pressure room.  Also attempted to call patient with  via cell phone,as provided by him: [phone], without success.    Per RN, patient has not complained of pain, shortness of breath, or other distressing symptoms.  Per RN, patient is not eating.      PAST MEDICAL & SURGICAL HISTORY:  Gout    CKD (chronic kidney disease)    HLD (hyperlipidemia)    HTN (hypertension)    No significant past surgical history        SOCIAL HISTORY:  , has a son.  Per son, he is the patient's health care proxy; he is bringing paperwork to the hospital.  Admitted from:  home   Substance abuse history:  unable to obtain  Voodoo:      Yazidi                              Preferred Language: Montenegrin (per patient).  Per son, patient speaks Montenegrin, Kinyarwanda.  Patient with limited English    Surrogate/HCP/Guardian:       Rebel Lagos     Phone#: [phone]    FAMILY HISTORY:    Baseline ADLs (prior to admission): limited information from son.  Patient with recent d/c from [employer]    Allergies    No Known Allergies    Intolerances    Review of Systems: As above    MEDICATIONS  (STANDING):  ALBUTerol    90 MICROgram(s) HFA Inhaler 1 Puff(s) Inhalation every 4 hours  allopurinol 100 milliGRAM(s) Oral at bedtime  amLODIPine   Tablet 10 milliGRAM(s) Oral daily  ascorbic acid 500 milliGRAM(s) Oral daily  cholecalciferol 2000 Unit(s) Oral daily  dexAMETHasone  Injectable 6 milliGRAM(s) IV Push daily  dextrose 5% + sodium chloride 0.45% 1000 milliLiter(s) (60 mL/Hr) IV Continuous <Continuous>  epoetin jose-epbx (RETACRIT) Injectable 15635 Unit(s) SubCutaneous <User Schedule>  famotidine Injectable 20 milliGRAM(s) IV Push daily  heparin   Injectable 5000 Unit(s) SubCutaneous every 8 hours  hydrALAZINE 50 milliGRAM(s) Oral three times a day  latanoprost 0.005% Ophthalmic Solution 1 Drop(s) Both EYES at bedtime  metoprolol tartrate 50 milliGRAM(s) Oral three times a day  montelukast 10 milliGRAM(s) Oral daily  sevelamer carbonate 800 milliGRAM(s) Oral three times a day with meals  sodium bicarbonate 650 milliGRAM(s) Oral three times a day  zinc sulfate 220 milliGRAM(s) Oral daily    MEDICATIONS  (PRN):  acetaminophen   Tablet .. 650 milliGRAM(s) Oral every 6 hours PRN Temp greater or equal to 38C (100.4F)  guaiFENesin  milliGRAM(s) Oral every 12 hours PRN Cough      PHYSICAL EXAM:    Vital Signs Last 24 Hrs  T(C): 36.6 (16 Jan 2021 07:35), Max: 36.6 (16 Jan 2021 05:26)  T(F): 97.8 (16 Jan 2021 07:35), Max: 97.9 (16 Jan 2021 05:26)  HR: 84 (16 Jan 2021 07:35) (82 - 103)  BP: 136/65 (16 Jan 2021 07:35) (112/50 - 136/65)  BP(mean): --  RR: 19 (16 Jan 2021 07:35) (19 - 20)  SpO2: 95% (16 Jan 2021 07:35) (95% - 100%)    karnofsky:  Gen: In NAD.  No pain behaviors or work of breathing noted  Neuro: awake, communicates simple responses.  Head: NC/AT  Eyes: sclerae non-icteric  ENT: NRB  Resp: unlabored  : no fay  Peripheral Vasc: warm, no edema  Int: warm and dry  Psych: no psychomotor agitation      LABS:                        9.8    6.84  )-----------( 106      ( 16 Jan 2021 06:52 )             32.0     01-16    142  |  104  |  188<H>  ----------------------------<  157<H>  4.1   |  21<L>  |  5.27<H>    Ca    8.2<L>      16 Jan 2021 06:52  Phos  7.0     01-16  Mg     1.9     01-16    TPro  6.3  /  Alb  2.2<L>  /  TBili  0.8  /  DBili  x   /  AST  18  /  ALT  22  /  AlkPhos  82  01-16        RADIOLOGY & ADDITIONAL STUDIES: reviewed    ADVANCE DIRECTIVES:   Advanced Care Planning discussion total time spent:               HPI:  89 year old Arabic male with medical history significant for HTN, HLD, CKD, and Gout and no significant surgical history presents to ED c/o worsening SOB since this morning. Pt was recently discharged from [facility] on 1/5/21 where he tested positive for covid-19. Pt was d/c on Dexamethasone and Eliquis. He states that he was having increasing difficulty breathing, even on minimal ambulation. Also started having bleed from nose and gums. States that it started today, and is mild amount of blood loss. Denies fevers, nausea, emesis, chest pain, abdominal pain, dysuria, hematuria, diarrhea, constipation, or any other acute complaints. NKDA .     ED: ICU was consulted as pt was hypoxic and on NRB 15 L, initially. Patient was given dose of decadron. His respiratory status improved, saturating 99% on NRB. So patient was downgraded to medicine. (12 Jan 2021 20:23)    89 yo male with a history of HTN, HLD, CKD, and gout and no significant surgical history, who recently tested positive for COVID at [facility] and d/gamaliel home on dexamethasone and Eliquis, presents to [facility] with worsening shortness of breath since morning of presentation. On presentation, he reported increased difficulty breathing, even with minimal ambulation, epistaxis and bleeding of gums.  Denied fevers, nausea, emesis, chest pain, abdominal pain, dysuria, hematuria, diarrhea, constipation, or any other acute complaints on presentation.  In the ED, ICU was consulted, as patient was hypoxic on 15 liters, moved to NRB, then sats became 97%, received decadron. CXR in ED revealed bilateral lung parenchymal infiltrates identified with appearance suggestive for atypical viral pneumonia/Covid 19.  + Troponins, BNP elevated (9057).    Patient is on decadron, albuterol, montelukast, requires supplemental oxygen, on NRB.  Patient is followed by cardiology, received lasix, on lopressor, hydralazine, amlodipine.  Followed by nephrology, KARIN on CKD (worsening), not a candidate for dialysis, as per notes, per outpatient nephrologist.  For goals of care, support.    Limited information from patient.  Spoke to patient in both English and with  [id number]; patient states that he speaks [place]araguan.  Affirms that he is ok.  Denies pain.  Unable to hear answers to additional questions, on non-rebreather, in negative pressure room.  Also attempted to call patient with  via cell phone,as provided by him: [phone], without success.    Per RN, patient has not complained of pain, shortness of breath, or other distressing symptoms.        PAST MEDICAL & SURGICAL HISTORY:  Gout    CKD (chronic kidney disease)    HLD (hyperlipidemia)    HTN (hypertension)    No significant past surgical history        SOCIAL HISTORY:  , has a son.  Per son, he is the patient's health care proxy; he is bringing paperwork to the hospital.  Admitted from:  home   Substance abuse history:  unable to obtain  Faith:      Mandaen                              Preferred Language: Nicaraguan (per patient).  Per son, patient speaks Nicaraguan, Latvian.  Patient with limited English    Surrogate/HCP/Guardian:       Rebel Lagos     Phone#: [phone]    FAMILY HISTORY:    Baseline ADLs (prior to admission): limited information from son.  Patient with recent d/c from [facility]    Allergies    No Known Allergies    Intolerances    Review of Systems: As above    MEDICATIONS  (STANDING):  ALBUTerol    90 MICROgram(s) HFA Inhaler 1 Puff(s) Inhalation every 4 hours  allopurinol 100 milliGRAM(s) Oral at bedtime  amLODIPine   Tablet 10 milliGRAM(s) Oral daily  ascorbic acid 500 milliGRAM(s) Oral daily  cholecalciferol 2000 Unit(s) Oral daily  dexAMETHasone  Injectable 6 milliGRAM(s) IV Push daily  dextrose 5% + sodium chloride 0.45% 1000 milliLiter(s) (60 mL/Hr) IV Continuous <Continuous>  epoetin jose-epbx (RETACRIT) Injectable 14775 Unit(s) SubCutaneous <User Schedule>  famotidine Injectable 20 milliGRAM(s) IV Push daily  heparin   Injectable 5000 Unit(s) SubCutaneous every 8 hours  hydrALAZINE 50 milliGRAM(s) Oral three times a day  latanoprost 0.005% Ophthalmic Solution 1 Drop(s) Both EYES at bedtime  metoprolol tartrate 50 milliGRAM(s) Oral three times a day  montelukast 10 milliGRAM(s) Oral daily  sevelamer carbonate 800 milliGRAM(s) Oral three times a day with meals  sodium bicarbonate 650 milliGRAM(s) Oral three times a day  zinc sulfate 220 milliGRAM(s) Oral daily    MEDICATIONS  (PRN):  acetaminophen   Tablet .. 650 milliGRAM(s) Oral every 6 hours PRN Temp greater or equal to 38C (100.4F)  guaiFENesin  milliGRAM(s) Oral every 12 hours PRN Cough      PHYSICAL EXAM:    Vital Signs Last 24 Hrs  T(C): 36.6 (16 Jan 2021 07:35), Max: 36.6 (16 Jan 2021 05:26)  T(F): 97.8 (16 Jan 2021 07:35), Max: 97.9 (16 Jan 2021 05:26)  HR: 84 (16 Jan 2021 07:35) (82 - 103)  BP: 136/65 (16 Jan 2021 07:35) (112/50 - 136/65)  BP(mean): --  RR: 19 (16 Jan 2021 07:35) (19 - 20)  SpO2: 95% (16 Jan 2021 07:35) (95% - 100%)    karnofsky: 40-50% est  Limited exam to avoid COVID infection/spread  Gen: In NAD.  No pain behaviors or work of breathing noted  Neuro: awake, communicates simple responses.  Head: NC/AT  Eyes: sclerae non-icteric  ENT: NRB  Resp: unlabored  Peripheral Vasc: warm, no edema  Int: warm and dry  Psych: no psychomotor agitation      LABS:                        9.8    6.84  )-----------( 106      ( 16 Jan 2021 06:52 )             32.0     01-16    142  |  104  |  188<H>  ----------------------------<  157<H>  4.1   |  21<L>  |  5.27<H>    Ca    8.2<L>      16 Jan 2021 06:52  Phos  7.0     01-16  Mg     1.9     01-16    TPro  6.3  /  Alb  2.2<L>  /  TBili  0.8  /  DBili  x   /  AST  18  /  ALT  22  /  AlkPhos  82  01-16        RADIOLOGY & ADDITIONAL STUDIES: reviewed    ADVANCE DIRECTIVES:   Advanced Care Planning discussion total time spent:  22 minutes

## 2021-01-16 NOTE — PROGRESS NOTE ADULT - ASSESSMENT
89 year old Azerbaijani male with medical history significant for HTN, HLD, CKD, and Gout with SOB,COVID pneumonia.  1.COVID+-dexamathasone,ID f/u.  2.HTN-cont bp medication.  3.CRI-Renal f/u,IVF.  4.Lipid d/o-statin.  5.GI and DVT prophylaxis.

## 2021-01-17 NOTE — PROGRESS NOTE ADULT - PROBLEM SELECTOR PROBLEM 2
HTN (hypertension)
Coronavirus infection
HTN (hypertension)
Coronavirus infection
Coronavirus infection

## 2021-01-17 NOTE — CHART NOTE - NSCHARTNOTEFT_GEN_A_CORE
Pt son Mr Luque called in  morning to explain current clinical condition in detail. As informed by night team family wanted to take patient home and doesn't want to die in hospital. Pt currently desaturating to low 80's and in respiratory distress. Son explained in detail that patient is currently requiring very high oxygen supplementation and will be in significant respiratory distress if discharged without oxygen. Son agreed to keep patient in oxygen as he wants to keep his father comfortable. Mr Luque requested if he can see his father on daily basis. Nursing supervisor and Dr Hanna updated regarding current situation and son allowed to visit today for 10-15min. Nurse Biggs informed about the plan

## 2021-01-17 NOTE — PROGRESS NOTE ADULT - SUBJECTIVE AND OBJECTIVE BOX
Patient is a 89y old  Male who presents with a chief complaint of respiratory distress (17 Jan 2021 10:02)  Lethargic, azrousable, laying in bed in NAD. Still hypoxemic on NC and NRBM.    INTERVAL HPI/OVERNIGHT EVENTS:      VITAL SIGNS:  T(F): 97.8 (01-17-21 @ 11:03)  HR: 62 (01-17-21 @ 11:03)  BP: 121/79 (01-17-21 @ 11:03)  RR: 26 (01-17-21 @ 11:03)  SpO2: 74% (01-17-21 @ 11:03)  Wt(kg): --  I&O's Detail    16 Jan 2021 07:01  -  17 Jan 2021 07:00  --------------------------------------------------------  IN:    dextrose 5% + sodium chloride 0.45% w/ Additives: 480 mL  Total IN: 480 mL    OUT:  Total OUT: 0 mL    Total NET: 480 mL              REVIEW OF SYSTEMS:    CONSTITUTIONAL:  No fevers, chills, sweats    HEENT:  Eyes:  No diplopia or blurred vision. ENT:  No earache, sore throat or runny nose.    CARDIOVASCULAR:  No pressure, squeezing, tightness, or heaviness about the chest; no palpitations.    RESPIRATORY:  Per HPI    GASTROINTESTINAL:  No abdominal pain, nausea, vomiting or diarrhea.    GENITOURINARY:  No dysuria, frequency or urgency.    NEUROLOGIC:  No paresthesias, fasciculations, seizures or weakness.    PSYCHIATRIC:  No disorder of thought or mood.      PHYSICAL EXAM:    Constitutional: Well developed and nourished  Eyes:Perrla  ENMT: normal  Neck:supple  Respiratory: good air entry  Cardiovascular: S1 S2 regular  Gastrointestinal: Soft, Non tender  Extremities: No edema  Vascular:normal  Neurological: Lethargic  Musculoskeletal:Normal      MEDICATIONS  (STANDING):  ALBUTerol    90 MICROgram(s) HFA Inhaler 1 Puff(s) Inhalation every 4 hours  dexAMETHasone  Injectable 6 milliGRAM(s) IV Push daily  epoetin jose-epbx (RETACRIT) Injectable 96049 Unit(s) SubCutaneous <User Schedule>  famotidine Injectable 20 milliGRAM(s) IV Push daily  heparin   Injectable 5000 Unit(s) SubCutaneous every 8 hours  latanoprost 0.005% Ophthalmic Solution 1 Drop(s) Both EYES at bedtime  metoprolol tartrate 50 milliGRAM(s) Oral three times a day  sevelamer carbonate 800 milliGRAM(s) Oral three times a day with meals  sodium bicarbonate 650 milliGRAM(s) Oral three times a day    MEDICATIONS  (PRN):  acetaminophen   Tablet .. 650 milliGRAM(s) Oral every 6 hours PRN Temp greater or equal to 38C (100.4F)  guaiFENesin  milliGRAM(s) Oral every 12 hours PRN Cough  HYDROmorphone  Injectable 0.5 milliGRAM(s) IV Push every 4 hours PRN Respiratory distress      Allergies    No Known Allergies    Intolerances        LABS:                        10.2   3.31  )-----------( 118      ( 17 Jan 2021 06:09 )             33.7     01-17    142  |  107  |  199<H>  ----------------------------<  110<H>  4.3   |  17<L>  |  5.84<H>    Ca    8.1<L>      17 Jan 2021 06:09  Phos  6.3     01-17  Mg     2.0     01-17    TPro  6.3  /  Alb  2.1<L>  /  TBili  1.5<H>  /  DBili  x   /  AST  21  /  ALT  24  /  AlkPhos  94  01-17              CAPILLARY BLOOD GLUCOSE      POCT Blood Glucose.: 106 mg/dL (17 Jan 2021 07:17)  POCT Blood Glucose.: 118 mg/dL (17 Jan 2021 04:12)    pro-bnp -- 01-15 @ 07:02     d-dimer 4239  01-15 @ 07:02  pro-bnp 9057 01-12 @ 16:37     d-dimer --  01-12 @ 16:37      RADIOLOGY & ADDITIONAL TESTS:    CXR:    Ct scan chest:    ekg;    echo:

## 2021-01-17 NOTE — PROGRESS NOTE ADULT - PROBLEM SELECTOR PROBLEM 4
Elevated troponin
Elevated troponin
CKD (chronic kidney disease)
Elevated troponin
Elevated troponin
CKD (chronic kidney disease)
Elevated troponin
CKD (chronic kidney disease)

## 2021-01-17 NOTE — PROGRESS NOTE ADULT - PROVIDER SPECIALTY LIST ADULT
Internal Medicine
Cardiology
Internal Medicine
Nephrology
Nephrology
Cardiology
Infectious Disease
Infectious Disease
Internal Medicine
Internal Medicine
Pulmonology
Cardiology
Cardiology
Internal Medicine
Pulmonology
Pulmonology
Internal Medicine
Pulmonology
Pulmonology
Internal Medicine
Internal Medicine

## 2021-01-17 NOTE — PROGRESS NOTE ADULT - SUBJECTIVE AND OBJECTIVE BOX
Patient is a 89y old  Male who presents with a chief complaint of respiratory distress (17 Jan 2021 12:09)      INTERVAL HPI/OVERNIGHT EVENTS:      VITAL SIGNS:  T(F): 97.8 (01-17-21 @ 11:03)  HR: 62 (01-17-21 @ 11:03)  BP: 121/79 (01-17-21 @ 11:03)  RR: 26 (01-17-21 @ 11:03)  SpO2: 74% (01-17-21 @ 11:03)  Wt(kg): --  I&O's Detail    16 Jan 2021 07:01  -  17 Jan 2021 07:00  --------------------------------------------------------  IN:    dextrose 5% + sodium chloride 0.45% w/ Additives: 480 mL  Total IN: 480 mL    OUT:  Total OUT: 0 mL    Total NET: 480 mL              REVIEW OF SYSTEMS:    CONSTITUTIONAL:  No fevers, chills, sweats    HEENT:  Eyes:  No diplopia or blurred vision. ENT:  No earache, sore throat or runny nose.    CARDIOVASCULAR:  No pressure, squeezing, tightness, or heaviness about the chest; no palpitations.    RESPIRATORY:  Per HPI    GASTROINTESTINAL:  No abdominal pain, nausea, vomiting or diarrhea.    GENITOURINARY:  No dysuria, frequency or urgency.    NEUROLOGIC:  No paresthesias, fasciculations, seizures or weakness.    PSYCHIATRIC:  No disorder of thought or mood.      PHYSICAL EXAM:    Constitutional: Well developed and nourished  Eyes:Perrla  ENMT: normal  Neck:supple  Respiratory: good air entry  Cardiovascular: S1 S2 regular  Gastrointestinal: Soft, Non tender  Extremities: No edema  Vascular:normal  Neurological:Awake, alert,Ox3  Musculoskeletal:Normal      MEDICATIONS  (STANDING):  ALBUTerol    90 MICROgram(s) HFA Inhaler 1 Puff(s) Inhalation every 4 hours  dexAMETHasone  Injectable 6 milliGRAM(s) IV Push daily  epoetin jose-epbx (RETACRIT) Injectable 81003 Unit(s) SubCutaneous <User Schedule>  famotidine Injectable 20 milliGRAM(s) IV Push daily  heparin   Injectable 5000 Unit(s) SubCutaneous every 8 hours  latanoprost 0.005% Ophthalmic Solution 1 Drop(s) Both EYES at bedtime  metoprolol tartrate 50 milliGRAM(s) Oral three times a day  sevelamer carbonate 800 milliGRAM(s) Oral three times a day with meals  sodium bicarbonate 650 milliGRAM(s) Oral three times a day    MEDICATIONS  (PRN):  acetaminophen   Tablet .. 650 milliGRAM(s) Oral every 6 hours PRN Temp greater or equal to 38C (100.4F)  guaiFENesin  milliGRAM(s) Oral every 12 hours PRN Cough  HYDROmorphone  Injectable 0.5 milliGRAM(s) IV Push every 4 hours PRN Respiratory distress      Allergies    No Known Allergies    Intolerances        LABS:                        10.2   3.31  )-----------( 118      ( 17 Jan 2021 06:09 )             33.7     01-17    142  |  107  |  199<H>  ----------------------------<  110<H>  4.3   |  17<L>  |  5.84<H>    Ca    8.1<L>      17 Jan 2021 06:09  Phos  6.3     01-17  Mg     2.0     01-17    TPro  6.3  /  Alb  2.1<L>  /  TBili  1.5<H>  /  DBili  x   /  AST  21  /  ALT  24  /  AlkPhos  94  01-17              CAPILLARY BLOOD GLUCOSE      POCT Blood Glucose.: 106 mg/dL (17 Jan 2021 07:17)  POCT Blood Glucose.: 118 mg/dL (17 Jan 2021 04:12)    pro-bnp -- 01-15 @ 07:02     d-dimer 4239  01-15 @ 07:02  pro-bnp 9057 01-12 @ 16:37     d-dimer --  01-12 @ 16:37      RADIOLOGY & ADDITIONAL TESTS:    CXR:    Ct scan chest:    ekg;    echo:

## 2021-01-17 NOTE — DISCHARGE NOTE FOR THE EXPIRED PATIENT - HOSPITAL COURSE
89 year old Khmer male with medical history significant for HTN, HLD, CKD, and Gout and no significant surgical history presents to ED c/o worsening SOB since this morning. Pt was recently discharged from [facility] on 1/5/21 where he tested positive for covid-19. Pt was d/c on Dexamethasone and Eliquis. He states that he was having increasing difficulty breathing, even on minimal ambulation. Also started having bleed from nose and gums. States that it started today, and is mild amount of blood loss. Denies fevers, nausea, emesis, chest pain, abdominal pain, dysuria, hematuria, diarrhea, constipation, or any other acute complaints. NKDA .   ED: ICU was consulted as pt was hypoxic and on NRB 15 L, initially. Patient was given  decadron and supportive care ,  His respiratory status improved at that time ,So patient was downgraded to medicine. patient condition deteriorated ,   son Rebel was contacted , he doesn't want any intubation or CPR so pt was made DNR/DNI.  patient was pronounced dead on 1 /17/2021 at 1 :45 pm .       89 year old Divehi male with medical history significant for HTN, HLD, CKD, and Gout and no significant surgical history presents to ED c/o worsening SOB since this morning. Pt was recently discharged from [facility] on 1/5/21 where he tested positive for covid-19. Pt was d/c on Dexamethasone and Eliquis. He states that he was having increasing difficulty breathing, even on minimal ambulation. Also started having bleed from nose and gums. States that it started today, and is mild amount of blood loss. Denies fevers, nausea, emesis, chest pain, abdominal pain, dysuria, hematuria, diarrhea, constipation, or any other acute complaints. NKDA .   ED: ICU was consulted as pt was hypoxic and on NRB 15 L, initially. Patient was given  decadron and supportive care ,  His respiratory status improved at that time ,So patient was downgraded to medicine. patient condition deteriorated ,   son Rebel was contacted , he doesn't want any intubation or CPR so pt was made DNR/DNI.  patient was pronounced dead on 1 /17/2021 at 1 :45 pm .

## 2021-01-17 NOTE — PROGRESS NOTE ADULT - PROBLEM SELECTOR PROBLEM 1
COVID-19
Acute hypoxemic respiratory failure due to COVID-19
COVID-19

## 2021-01-17 NOTE — PROGRESS NOTE ADULT - REASON FOR ADMISSION
respiratory distress

## 2021-01-17 NOTE — PROGRESS NOTE ADULT - SUBJECTIVE AND OBJECTIVE BOX
CHIEF COMPLAINT:Patient is a 89y old  Male who presents with a chief complaint of respiratory distress.Pt on NR.    	  REVIEW OF SYSTEMS:  [x ] Unable to obtain    PHYSICAL EXAM:  T(C): 36.6 (01-17-21 @ 08:10), Max: 37.1 (01-17-21 @ 04:24)  HR: 51 (01-17-21 @ 08:10) (51 - 99)  BP: 146/43 (01-17-21 @ 08:10) (114/55 - 146/99)  RR: 24 (01-17-21 @ 08:10) (19 - 24)  SpO2: 83% (01-17-21 @ 08:10) (83% - 97%)  Wt(kg): --  I&O's Summary    16 Jan 2021 07:01  -  17 Jan 2021 07:00  --------------------------------------------------------  IN: 480 mL / OUT: 0 mL / NET: 480 mL        Appearance: Normal	  HEENT:   Normal oral mucosa, PERRL, EOMI	  Lymphatic: No lymphadenopathy  Cardiovascular: Normal S1 S2, No JVD, No murmurs, No edema  Respiratory: B/L ronchi  Gastrointestinal:  Soft, Non-tender, + BS	  Skin: No rashes, No ecchymoses, No cyanosis	  Extremities: Normal range of motion, No clubbing, cyanosis or edema  Vascular: Peripheral pulses palpable 2+ bilaterally    MEDICATIONS  (STANDING):  ALBUTerol    90 MICROgram(s) HFA Inhaler 1 Puff(s) Inhalation every 4 hours  allopurinol 100 milliGRAM(s) Oral at bedtime  amLODIPine   Tablet 10 milliGRAM(s) Oral daily  ascorbic acid 500 milliGRAM(s) Oral daily  cholecalciferol 2000 Unit(s) Oral daily  dexAMETHasone  Injectable 6 milliGRAM(s) IV Push daily  epoetin jose-epbx (RETACRIT) Injectable 88665 Unit(s) SubCutaneous <User Schedule>  famotidine Injectable 20 milliGRAM(s) IV Push daily  heparin   Injectable 5000 Unit(s) SubCutaneous every 8 hours  hydrALAZINE 50 milliGRAM(s) Oral three times a day  latanoprost 0.005% Ophthalmic Solution 1 Drop(s) Both EYES at bedtime  metoprolol tartrate 50 milliGRAM(s) Oral three times a day  montelukast 10 milliGRAM(s) Oral daily  sevelamer carbonate 800 milliGRAM(s) Oral three times a day with meals  sodium bicarbonate 650 milliGRAM(s) Oral three times a day  zinc sulfate 220 milliGRAM(s) Oral daily      LABS:	 	                        10.2   3.31  )-----------( 118      ( 17 Jan 2021 06:09 )             33.7     01-17    142  |  107  |  199<H>  ----------------------------<  110<H>  4.3   |  17<L>  |  5.84<H>    Ca    8.1<L>      17 Jan 2021 06:09  Phos  6.3     01-17  Mg     2.0     01-17    TPro  6.3  /  Alb  2.1<L>  /  TBili  1.5<H>  /  DBili  x   /  AST  21  /  ALT  24  /  AlkPhos  94  01-17    proBNP: Serum Pro-Brain Natriuretic Peptide: 9057 pg/mL (01-12 @ 16:37)    Lipid Profile: Cholesterol 104  LDL --  HDL 41      HgA1c:   TSH: Thyroid Stimulating Hormone, Serum: 0.57 uU/mL (01-13 @ 07:25)

## 2021-01-17 NOTE — PROGRESS NOTE ADULT - ASSESSMENT
1. Hypoxic Respiratory Failure 2nd to PNA/Covid   - Covid detected.   - IGG positive   - CXR noted - b/l infiltrates   - S/P RRT for Respiratory distress.   - ICU consulted - no escalation of care at present.   - Vit C, Vit D, Zinc   - Continue Dexamethasone    - Continue Singulair and Pepcid   - Robitussin PRN for cough  - Tylenol PRN for temp   - O2 Supp - on NRM - still desaturates.   - Monitor O2 Sat.  - Monitor labs  - F.U CXR  - Unable to obtain VQ or CT given CKD   - DDimer 4239; CRP 15.88   - Monitor labs: ferritin, ddimer, ESR, LDH, CRP   - Prone positioning as tolerated   - Pulm F/U   - ID F/U   - Palliative F/U   - DNR/I - supportive care.   - Isolation precautions   - DVT and GI PPX     2. CKD   - Creat 5.84  - Avoid nephrotoxic drugs  - Renal F/U     3. Elevated Troponin  - Tele monitoring  - Echo   - F/U CE x 3.   - ACS protocol  - Cardio F/U     4. Gout  - Continue Allopurinol   - Not in acute exacerbation

## 2021-01-17 NOTE — PROGRESS NOTE ADULT - PROBLEM SELECTOR PROBLEM 3
Elevated troponin
CKD (chronic kidney disease)
Elevated troponin
CKD (chronic kidney disease)
Elevated troponin

## 2021-01-17 NOTE — PROGRESS NOTE ADULT - ASSESSMENT
89 year old Samoan male with medical history significant for HTN, HLD, CKD, and Gout with SOB,COVID pneumonia.  1.COVID+-dexamathasone,ID f/u.  2.HTN-cont bp medication.  3.CRI-Renal f/u.  4.Lipid d/o-statin.  5.GI and DVT prophylaxis.  6.Pt is DNR/DNI.supportive care.

## 2025-01-23 NOTE — PROGRESS NOTE ADULT - SUBJECTIVE AND OBJECTIVE BOX
PGY-1 Progress Note discussed with attending    PAGER #: [[phone]] TILL 5:00 PM  PLEASE CONTACT ON CALL TEAM:  - On Call Team (Please refer to Marcus) FROM 5:00 PM - 8:30PM  - Nightfloat Team FROM 8:30 -7:30 AM    CHIEF COMPLAINT & BRIEF HOSPITAL COURSE:    INTERVAL HPI/OVERNIGHT EVENTS:       REVIEW OF SYSTEMS:  CONSTITUTIONAL: No fever, weight loss, or fatigue  RESPIRATORY: No cough, wheezing, chills or hemoptysis; No shortness of breath  CARDIOVASCULAR: No chest pain, palpitations, dizziness, or leg swelling  GASTROINTESTINAL: No abdominal pain. No nausea, vomiting, or hematemesis; No diarrhea or constipation. No melena or hematochezia.  GENITOURINARY: No dysuria or hematuria, urinary frequency  NEUROLOGICAL: No headaches, memory loss, loss of strength, numbness, or tremors  SKIN: No itching, burning, rashes, or lesions     Vital Signs Last 24 Hrs  T(C): 35.8 (15 Allan 2021 11:31), Max: 37.2 (14 Jan 2021 20:35)  T(F): 96.5 (15 Allan 2021 11:31), Max: 98.9 (14 Jan 2021 20:35)  HR: 82 (15 Allan 2021 11:31) (82 - 95)  BP: 128/40 (15 Allan 2021 11:31) (115/44 - 158/59)  BP(mean): --  RR: 19 (15 Allan 2021 11:31) (19 - 20)  SpO2: 97% (15 Allan 2021 11:31) (93% - 97%)    PHYSICAL EXAMINATION:  GENERAL: NAD, well built  HEAD:  Atraumatic, Normocephalic  EYES:  conjunctiva and sclera clear  NECK: Supple, No JVD, Normal thyroid  CHEST/LUNG: Clear to auscultation. Clear to percussion bilaterally; No rales, rhonchi, wheezing, or rubs  HEART: Regular rate and rhythm; No murmurs, rubs, or gallops  ABDOMEN: Soft, Nontender, Nondistended; Bowel sounds present  NERVOUS SYSTEM:  Alert & Oriented X3,    EXTREMITIES:  2+ Peripheral Pulses, No clubbing, cyanosis, or edema  SKIN: warm dry                          8.8    5.88  )-----------( 93       ( 15 Allan 2021 07:02 )             28.7     01-15    142  |  107  |  186  ----------------------------<  154<H>  4.1   |  19<L>  |  5.26<H>    Ca    8.3<L>      15 Allan 2021 07:02  Phos  6.3     01-15  Mg     1.9     01-15    TPro  5.9<L>  /  Alb  2.3<L>  /  TBili  0.7  /  DBili  x   /  AST  17  /  ALT  22  /  AlkPhos  75  01-15    LIVER FUNCTIONS - ( 15 Allan 2021 07:02 )  Alb: 2.3 g/dL / Pro: 5.9 g/dL / ALK PHOS: 75 U/L / ALT: 22 U/L DA / AST: 17 U/L / GGT: x                   CAPILLARY BLOOD GLUCOSE      RADIOLOGY & ADDITIONAL TESTS:                   No PGY-1 Progress Note discussed with attending    PAGER #: [[phone]] TILL 5:00 PM  PLEASE CONTACT ON CALL TEAM:  - On Call Team (Please refer to Marcus) FROM 5:00 PM - 8:30PM  - Nightfloat Team FROM 8:30 -7:30 AM    CHIEF COMPLAINT & BRIEF HOSPITAL COURSE: Admitted for COVID pneumonia     INTERVAL HPI/ OVERNIGHT EVENTS: No significant event overnight. Pt is on NRB O2 sat 99%      REVIEW OF SYSTEMS:  CONSTITUTIONAL: No fever, weight loss, or fatigue  RESPIRATORY: No cough, wheezing, chills or hemoptysis; No shortness of breath  CARDIOVASCULAR: No chest pain, palpitations, dizziness, or leg swelling  GASTROINTESTINAL: No abdominal pain. No nausea, vomiting, or hematemesis; No diarrhea or constipation. No melena or hematochezia.  GENITOURINARY: No dysuria or hematuria, urinary frequency  NEUROLOGICAL: No headaches, memory loss, loss of strength, numbness, or tremors  SKIN: No itching, burning, rashes, or lesions     Vital Signs Last 24 Hrs  T(C): 35.8 (15 Allan 2021 11:31), Max: 37.2 (14 Jan 2021 20:35)  T(F): 96.5 (15 Allan 2021 11:31), Max: 98.9 (14 Jan 2021 20:35)  HR: 82 (15 Allan 2021 11:31) (82 - 95)  BP: 128/40 (15 Allan 2021 11:31) (115/44 - 158/59)  BP(mean): --  RR: 19 (15 Allan 2021 11:31) (19 - 20)  SpO2: 97% (15 Allan 2021 11:31) (93% - 97%)    PHYSICAL EXAMINATION:  GENERAL: NAD  HEAD:  Atraumatic, Normocephalic  EYES:  conjunctiva and sclera clear  NECK: Supple, No JVD, Normal thyroid  CHEST/LUNG: Clear to auscultation. Clear to percussion bilaterally; No rales, rhonchi, wheezing, or rubs  HEART: Regular rate and rhythm; No murmurs, rubs, or gallops  ABDOMEN: Soft, Nontender, Nondistended; Bowel sounds present  NERVOUS SYSTEM:  Alert & Oriented X3,    EXTREMITIES:  2+ Peripheral Pulses, No clubbing, cyanosis, or edema   SKIN: warm dry    MEDICATIONS  (STANDING):  ALBUTerol    90 MICROgram(s) HFA Inhaler 1 Puff(s) Inhalation every 4 hours  allopurinol 100 milliGRAM(s) Oral at bedtime  amLODIPine   Tablet 10 milliGRAM(s) Oral daily  ascorbic acid 500 milliGRAM(s) Oral daily  cholecalciferol 2000 Unit(s) Oral daily  dexAMETHasone  Injectable 6 milliGRAM(s) IV Push daily  dextrose 5% + sodium chloride 0.45% 1000 milliLiter(s) (60 mL/Hr) IV Continuous <Continuous>  epoetin jose-epbx (RETACRIT) Injectable 06482 Unit(s) SubCutaneous <User Schedule>  famotidine Injectable 20 milliGRAM(s) IV Push daily  heparin   Injectable 5000 Unit(s) SubCutaneous every 8 hours  hydrALAZINE 50 milliGRAM(s) Oral three times a day  latanoprost 0.005% Ophthalmic Solution 1 Drop(s) Both EYES at bedtime  metoprolol tartrate 50 milliGRAM(s) Oral three times a day  montelukast 10 milliGRAM(s) Oral daily  sevelamer carbonate 800 milliGRAM(s) Oral three times a day with meals  sodium bicarbonate 650 milliGRAM(s) Oral three times a day  zinc sulfate 220 milliGRAM(s) Oral daily    MEDICATIONS  (PRN):  acetaminophen   Tablet .. 650 milliGRAM(s) Oral every 6 hours PRN Temp greater or equal to 38C (100.4F)  guaiFENesin  milliGRAM(s) Oral every 12 hours PRN Cough                            8.8    5.88  )-----------( 93       ( 15 Allan 2021 07:02 )             28.7     01-15    142  |  107  |  186  ----------------------------<  154<H>  4.1   |  19<L>  |  5.26<H>    Ca    8.3<L>      15 Allan 2021 07:02  Phos  6.3     01-15  Mg     1.9     01-15    TPro  5.9<L>  /  Alb  2.3<L>  /  TBili  0.7  /  DBili  x   /  AST  17  /  ALT  22  /  AlkPhos  75  01-15    LIVER FUNCTIONS - ( 15 Allan 2021 07:02 )  Alb: 2.3 g/dL / Pro: 5.9 g/dL / ALK PHOS: 75 U/L / ALT: 22 U/L DA / AST: 17 U/L / GGT: x                   CAPILLARY BLOOD GLUCOSE      RADIOLOGY & ADDITIONAL TESTS: